# Patient Record
Sex: FEMALE | Race: BLACK OR AFRICAN AMERICAN | NOT HISPANIC OR LATINO
[De-identification: names, ages, dates, MRNs, and addresses within clinical notes are randomized per-mention and may not be internally consistent; named-entity substitution may affect disease eponyms.]

---

## 2017-01-18 ENCOUNTER — APPOINTMENT (OUTPATIENT)
Dept: OTOLARYNGOLOGY | Facility: CLINIC | Age: 82
End: 2017-01-18

## 2017-01-18 VITALS
HEART RATE: 80 BPM | DIASTOLIC BLOOD PRESSURE: 84 MMHG | TEMPERATURE: 97.7 F | SYSTOLIC BLOOD PRESSURE: 155 MMHG | OXYGEN SATURATION: 100 %

## 2017-01-18 RX ORDER — LOSARTAN POTASSIUM AND HYDROCHLOROTHIAZIDE 12.5; 5 MG/1; MG/1
50-12.5 TABLET ORAL
Qty: 90 | Refills: 0 | Status: ACTIVE | COMMUNITY
Start: 2017-01-13

## 2017-01-18 RX ORDER — AMLODIPINE BESYLATE 10 MG/1
10 TABLET ORAL
Qty: 90 | Refills: 0 | Status: ACTIVE | COMMUNITY
Start: 2016-11-28

## 2017-01-18 RX ORDER — LIDOCAINE 5 G/100G
5 OINTMENT TOPICAL
Qty: 106 | Refills: 0 | Status: ACTIVE | COMMUNITY
Start: 2016-06-23

## 2017-01-27 ENCOUNTER — OUTPATIENT (OUTPATIENT)
Dept: OUTPATIENT SERVICES | Facility: HOSPITAL | Age: 82
LOS: 1 days | End: 2017-01-27
Payer: COMMERCIAL

## 2017-01-27 PROCEDURE — 70553 MRI BRAIN STEM W/O & W/DYE: CPT

## 2017-01-27 PROCEDURE — A9585: CPT

## 2017-01-27 PROCEDURE — 70553 MRI BRAIN STEM W/O & W/DYE: CPT | Mod: 26

## 2017-02-01 ENCOUNTER — APPOINTMENT (OUTPATIENT)
Dept: OTOLARYNGOLOGY | Facility: CLINIC | Age: 82
End: 2017-02-01

## 2017-02-01 VITALS — OXYGEN SATURATION: 96 % | SYSTOLIC BLOOD PRESSURE: 149 MMHG | HEART RATE: 76 BPM | DIASTOLIC BLOOD PRESSURE: 82 MMHG

## 2017-02-01 DIAGNOSIS — H60.501 UNSPECIFIED ACUTE NONINFECTIVE OTITIS EXTERNA, RIGHT EAR: ICD-10-CM

## 2017-02-01 DIAGNOSIS — Z86.69 PERSONAL HISTORY OF OTHER DISEASES OF THE NERVOUS SYSTEM AND SENSE ORGANS: ICD-10-CM

## 2017-03-08 ENCOUNTER — OUTPATIENT (OUTPATIENT)
Dept: OUTPATIENT SERVICES | Facility: HOSPITAL | Age: 82
LOS: 1 days | End: 2017-03-08
Payer: COMMERCIAL

## 2017-03-08 PROCEDURE — G0297: CPT | Mod: 26

## 2017-04-21 PROCEDURE — G0297: CPT

## 2017-04-27 ENCOUNTER — APPOINTMENT (OUTPATIENT)
Dept: PULMONOLOGY | Facility: CLINIC | Age: 82
End: 2017-04-27

## 2017-04-27 VITALS
BODY MASS INDEX: 30.36 KG/M2 | HEIGHT: 62 IN | WEIGHT: 165 LBS | OXYGEN SATURATION: 98 % | TEMPERATURE: 98.4 F | HEART RATE: 74 BPM | SYSTOLIC BLOOD PRESSURE: 130 MMHG | DIASTOLIC BLOOD PRESSURE: 60 MMHG

## 2017-04-27 DIAGNOSIS — R91.8 OTHER NONSPECIFIC ABNORMAL FINDING OF LUNG FIELD: ICD-10-CM

## 2017-07-25 ENCOUNTER — FORM ENCOUNTER (OUTPATIENT)
Age: 82
End: 2017-07-25

## 2017-07-26 ENCOUNTER — OUTPATIENT (OUTPATIENT)
Dept: OUTPATIENT SERVICES | Facility: HOSPITAL | Age: 82
LOS: 1 days | End: 2017-07-26
Payer: MEDICARE

## 2017-07-26 PROCEDURE — 71250 CT THORAX DX C-: CPT | Mod: 26

## 2017-07-26 PROCEDURE — 71250 CT THORAX DX C-: CPT

## 2017-08-03 ENCOUNTER — APPOINTMENT (OUTPATIENT)
Dept: PULMONOLOGY | Facility: CLINIC | Age: 82
End: 2017-08-03

## 2017-08-03 VITALS
HEIGHT: 62 IN | BODY MASS INDEX: 30.36 KG/M2 | HEART RATE: 67 BPM | WEIGHT: 165 LBS | OXYGEN SATURATION: 99 % | DIASTOLIC BLOOD PRESSURE: 60 MMHG | SYSTOLIC BLOOD PRESSURE: 140 MMHG | TEMPERATURE: 96.9 F

## 2017-08-03 RX ORDER — AMOXICILLIN 500 MG/1
500 CAPSULE ORAL 3 TIMES DAILY
Qty: 21 | Refills: 6 | Status: ACTIVE | COMMUNITY
Start: 2017-08-03 | End: 1900-01-01

## 2017-10-08 ENCOUNTER — FORM ENCOUNTER (OUTPATIENT)
Age: 82
End: 2017-10-08

## 2017-10-09 ENCOUNTER — OUTPATIENT (OUTPATIENT)
Dept: OUTPATIENT SERVICES | Facility: HOSPITAL | Age: 82
LOS: 1 days | End: 2017-10-09
Payer: MEDICARE

## 2017-10-09 PROCEDURE — 71250 CT THORAX DX C-: CPT | Mod: 26

## 2017-10-09 PROCEDURE — 71250 CT THORAX DX C-: CPT

## 2017-10-13 ENCOUNTER — APPOINTMENT (OUTPATIENT)
Dept: PULMONOLOGY | Facility: CLINIC | Age: 82
End: 2017-10-13
Payer: MEDICARE

## 2017-10-13 VITALS
HEART RATE: 81 BPM | BODY MASS INDEX: 30.36 KG/M2 | HEIGHT: 62 IN | SYSTOLIC BLOOD PRESSURE: 146 MMHG | DIASTOLIC BLOOD PRESSURE: 64 MMHG | WEIGHT: 165 LBS | TEMPERATURE: 97 F | OXYGEN SATURATION: 98 %

## 2017-10-13 DIAGNOSIS — J43.2 CENTRILOBULAR EMPHYSEMA: ICD-10-CM

## 2017-10-13 PROCEDURE — 94010 BREATHING CAPACITY TEST: CPT

## 2017-10-13 PROCEDURE — 99215 OFFICE O/P EST HI 40 MIN: CPT | Mod: 25

## 2017-10-14 PROBLEM — J43.2 CENTRILOBULAR EMPHYSEMA: Status: ACTIVE | Noted: 2017-10-14

## 2017-11-29 ENCOUNTER — OUTPATIENT (OUTPATIENT)
Dept: OUTPATIENT SERVICES | Facility: HOSPITAL | Age: 82
LOS: 1 days | End: 2017-11-29
Payer: MEDICARE

## 2017-11-29 PROCEDURE — 70551 MRI BRAIN STEM W/O DYE: CPT | Mod: 26

## 2017-11-29 PROCEDURE — 70551 MRI BRAIN STEM W/O DYE: CPT

## 2017-12-05 ENCOUNTER — APPOINTMENT (OUTPATIENT)
Dept: OTOLARYNGOLOGY | Facility: CLINIC | Age: 82
End: 2017-12-05
Payer: MEDICARE

## 2017-12-05 VITALS
OXYGEN SATURATION: 97 % | SYSTOLIC BLOOD PRESSURE: 155 MMHG | DIASTOLIC BLOOD PRESSURE: 71 MMHG | HEART RATE: 71 BPM | TEMPERATURE: 98 F

## 2017-12-05 DIAGNOSIS — H61.23 IMPACTED CERUMEN, BILATERAL: ICD-10-CM

## 2017-12-05 DIAGNOSIS — R42 DIZZINESS AND GIDDINESS: ICD-10-CM

## 2017-12-05 DIAGNOSIS — H90.5 UNSPECIFIED SENSORINEURAL HEARING LOSS: ICD-10-CM

## 2017-12-05 PROCEDURE — 99214 OFFICE O/P EST MOD 30 MIN: CPT | Mod: 25

## 2017-12-05 PROCEDURE — 92550 TYMPANOMETRY & REFLEX THRESH: CPT

## 2017-12-05 PROCEDURE — 92557 COMPREHENSIVE HEARING TEST: CPT

## 2017-12-05 PROCEDURE — 69210 REMOVE IMPACTED EAR WAX UNI: CPT

## 2018-02-22 ENCOUNTER — INPATIENT (INPATIENT)
Facility: HOSPITAL | Age: 83
LOS: 7 days | Discharge: HOME HEALTH | DRG: 180 | End: 2018-03-02
Attending: INTERNAL MEDICINE | Admitting: INTERNAL MEDICINE
Payer: MEDICARE

## 2018-02-22 VITALS
DIASTOLIC BLOOD PRESSURE: 78 MMHG | SYSTOLIC BLOOD PRESSURE: 175 MMHG | RESPIRATION RATE: 16 BRPM | OXYGEN SATURATION: 96 % | HEART RATE: 78 BPM

## 2018-02-22 DIAGNOSIS — Z98.890 OTHER SPECIFIED POSTPROCEDURAL STATES: Chronic | ICD-10-CM

## 2018-02-22 LAB
ALBUMIN SERPL ELPH-MCNC: 4 G/DL — SIGNIFICANT CHANGE UP (ref 3.3–5)
ALP SERPL-CCNC: 235 U/L — HIGH (ref 40–120)
ALT FLD-CCNC: 28 U/L — SIGNIFICANT CHANGE UP (ref 10–45)
ANION GAP SERPL CALC-SCNC: 15 MMOL/L — SIGNIFICANT CHANGE UP (ref 5–17)
AST SERPL-CCNC: 30 U/L — SIGNIFICANT CHANGE UP (ref 10–40)
BASOPHILS NFR BLD AUTO: 0.3 % — SIGNIFICANT CHANGE UP (ref 0–2)
BILIRUB SERPL-MCNC: 0.6 MG/DL — SIGNIFICANT CHANGE UP (ref 0.2–1.2)
BUN SERPL-MCNC: 18 MG/DL — SIGNIFICANT CHANGE UP (ref 7–23)
CALCIUM SERPL-MCNC: 10.5 MG/DL — SIGNIFICANT CHANGE UP (ref 8.4–10.5)
CHLORIDE SERPL-SCNC: 95 MMOL/L — LOW (ref 96–108)
CK MB CFR SERPL CALC: 2.8 NG/ML — SIGNIFICANT CHANGE UP (ref 0–6.7)
CK SERPL-CCNC: 47 U/L — SIGNIFICANT CHANGE UP (ref 25–170)
CO2 SERPL-SCNC: 25 MMOL/L — SIGNIFICANT CHANGE UP (ref 22–31)
CREAT SERPL-MCNC: 0.9 MG/DL — SIGNIFICANT CHANGE UP (ref 0.5–1.3)
EOSINOPHIL NFR BLD AUTO: 1.7 % — SIGNIFICANT CHANGE UP (ref 0–6)
GGT SERPL-CCNC: 63 U/L — HIGH (ref 8–40)
GLUCOSE SERPL-MCNC: 103 MG/DL — HIGH (ref 70–99)
HCT VFR BLD CALC: 38.8 % — SIGNIFICANT CHANGE UP (ref 34.5–45)
HGB BLD-MCNC: 13.4 G/DL — SIGNIFICANT CHANGE UP (ref 11.5–15.5)
LYMPHOCYTES # BLD AUTO: 14.6 % — SIGNIFICANT CHANGE UP (ref 13–44)
MCHC RBC-ENTMCNC: 28.6 PG — SIGNIFICANT CHANGE UP (ref 27–34)
MCHC RBC-ENTMCNC: 34.5 G/DL — SIGNIFICANT CHANGE UP (ref 32–36)
MCV RBC AUTO: 82.7 FL — SIGNIFICANT CHANGE UP (ref 80–100)
MONOCYTES NFR BLD AUTO: 7.7 % — SIGNIFICANT CHANGE UP (ref 2–14)
NEUTROPHILS NFR BLD AUTO: 75.7 % — SIGNIFICANT CHANGE UP (ref 43–77)
NT-PROBNP SERPL-SCNC: 634 PG/ML — HIGH (ref 0–300)
PLATELET # BLD AUTO: 387 K/UL — SIGNIFICANT CHANGE UP (ref 150–400)
POTASSIUM SERPL-MCNC: 4.2 MMOL/L — SIGNIFICANT CHANGE UP (ref 3.5–5.3)
POTASSIUM SERPL-SCNC: 4.2 MMOL/L — SIGNIFICANT CHANGE UP (ref 3.5–5.3)
PROT SERPL-MCNC: 7.9 G/DL — SIGNIFICANT CHANGE UP (ref 6–8.3)
RBC # BLD: 4.69 M/UL — SIGNIFICANT CHANGE UP (ref 3.8–5.2)
RBC # FLD: 14.7 % — SIGNIFICANT CHANGE UP (ref 10.3–16.9)
SODIUM SERPL-SCNC: 135 MMOL/L — SIGNIFICANT CHANGE UP (ref 135–145)
TROPONIN T SERPL-MCNC: <0.01 NG/ML — SIGNIFICANT CHANGE UP (ref 0–0.01)
WBC # BLD: 10.9 K/UL — HIGH (ref 3.8–10.5)
WBC # FLD AUTO: 10.9 K/UL — HIGH (ref 3.8–10.5)

## 2018-02-22 PROCEDURE — 99285 EMERGENCY DEPT VISIT HI MDM: CPT | Mod: 25

## 2018-02-22 PROCEDURE — 93010 ELECTROCARDIOGRAM REPORT: CPT

## 2018-02-22 PROCEDURE — 71046 X-RAY EXAM CHEST 2 VIEWS: CPT | Mod: 26

## 2018-02-22 RX ORDER — HEPARIN SODIUM 5000 [USP'U]/ML
5000 INJECTION INTRAVENOUS; SUBCUTANEOUS EVERY 8 HOURS
Qty: 0 | Refills: 0 | Status: DISCONTINUED | OUTPATIENT
Start: 2018-02-22 | End: 2018-02-23

## 2018-02-22 RX ORDER — IPRATROPIUM/ALBUTEROL SULFATE 18-103MCG
3 AEROSOL WITH ADAPTER (GRAM) INHALATION EVERY 6 HOURS
Qty: 0 | Refills: 0 | Status: DISCONTINUED | OUTPATIENT
Start: 2018-02-22 | End: 2018-03-02

## 2018-02-22 NOTE — ED ADULT NURSE NOTE - OBJECTIVE STATEMENT
Patient reports issues with feeling short of breath off and on for 1 month now and felt worse out of all days today "when I woke up."  Patient reports she was not doing any activity at that time.  Reports moments of lateral side pains.  EKG performed upon arrival.  IV labs in progress.

## 2018-02-22 NOTE — H&P ADULT - NSHPPHYSICALEXAM_GEN_ALL_CORE
VITAL SIGNS:  T(F): 97.6 (02-22-18 @ 22:43), Max: 98.7 (02-22-18 @ 22:00)  HR: 88 (02-22-18 @ 22:43) (78 - 88)  BP: 143/76 (02-22-18 @ 22:43) (143/76 - 175/78)  BP(mean): --  RR: 18 (02-22-18 @ 22:43) (16 - 18)  SpO2: 97% (02-22-18 @ 22:43) (96% - 97%)    General: Thin, chronically ill appearing, non-diaphoretic, non-toxic appearing  HEENT: sclerae anicteric, PERRL, EOMI, dry MM, no pharyngeal erythema,  no JVD, negative hepatojugular reflex  Respiratory: Trace R sided rales, no rhonchi, no wheezes  Cardiac: RRR, no m/g/r  Gastrointestinal: soft, NT/ND; no rebound or guarding; +BSx4  Back: spine midline, no bony tenderness or step-offs; no CVAT B/L  Extremities: WWP, no clubbing or cyanosis; no peripheral edema  Musculoskeletal: NROM x4; no joint swelling, tenderness or erythema  Vascular: 2+ radial, femoral, DP/PT pulses B/L  Dermatologic: skin warm, dry and intact; LE venous stasis dermatitis  Lymphatic: no submandibular or cervical LAD  Neurologic: AAOx3; CNII-XII grossly intact; no focal deficits  Psychiatric: affect and characteristics of appearance, verbalizations, behaviors are appropriate, denies SI/HI/AH/VH VITAL SIGNS:  T(F): 97.6 (02-22-18 @ 22:43), Max: 98.7 (02-22-18 @ 22:00)  HR: 88 (02-22-18 @ 22:43) (78 - 88)  BP: 143/76 (02-22-18 @ 22:43) (143/76 - 175/78)  BP(mean): --  RR: 18 (02-22-18 @ 22:43) (16 - 18)  SpO2: 97% (02-22-18 @ 22:43) (96% - 97%)    General: Thin, chronically ill appearing, non-diaphoretic, non-toxic appearing  HEENT: sclerae anicteric, PERRL, EOMI, dry MM, no pharyngeal erythema,  no JVD, negative hepatojugular reflex  Respiratory: Trace R sided rales, no rhonchi, no wheezes  Cardiac: RRR, no m/g/r  Gastrointestinal: soft, NT/ND; no rebound or guarding; +BSx4  Back: spine midline, no bony tenderness or step-offs; no CVAT B/L  Extremities: WWP, no clubbing or cyanosis; 1+ LE edema R > L   Musculoskeletal: NROM x4; no joint swelling, tenderness or erythema  Vascular: 2+ radial, femoral, DP/PT pulses B/L  Dermatologic: skin warm, dry and intact; LE venous stasis dermatitis  Lymphatic: no submandibular or cervical LAD  Neurologic: AAOx3; CNII-XII grossly intact; no focal deficits  Psychiatric: affect and characteristics of appearance, verbalizations, behaviors are appropriate, denies SI/HI/AH/VH

## 2018-02-22 NOTE — H&P ADULT - PROBLEM SELECTOR PLAN 2
Pt satting 98% on RA at rest, desatting to 86% while ambulating.  Denies previous cardiac hx.  BNP elevated, however, previous BNP unavailable for reference. Pt euvolemic to slightly dry on exam.  Etiology likely 2/2 PNA in setting of severe emphysema.   - F/u CT chest   - F/u echo

## 2018-02-22 NOTE — ED ADULT TRIAGE NOTE - OTHER COMPLAINTS
pt c.o worsening mid sternal cp with sob x 1 week. pt also admits to dark stool. denies abd pain. no n/v.

## 2018-02-22 NOTE — H&P ADULT - ASSESSMENT
84 yo F heavy smoker w/ PMHx of pulmonary nodules, emphysema, previous hx of TB, HTN presents w/ worsening SULLIVAN x 2 wks, increased sputum production and cough w/ imaging concerning for post-obstructive PNA.

## 2018-02-22 NOTE — H&P ADULT - NSHPREVIEWOFSYSTEMS_GEN_ALL_CORE
REVIEW OF SYSTEMS:    CONSTITUTIONAL: Patient denies weakness, fevers or chills  EYES/ENT: Patient denies visual changes;  denies vertigo or throat pain   NECK: Patient denies pain or stiffness  RESPIRATORY: Patient denies cough, wheezing, hemoptysis; denies shortness of breath  CARDIOVASCULAR: Patient denies chest pain or palpitations  GASTROINTESTINAL: Patient denies abdominal or epigastric pain, nausea, vomiting, or hematemesis, diarrhea or constipation, melena or hematochezia.  GENITOURINARY: Patient denies dysuria, frequency or hematuria  NEUROLOGICAL: Patient denies numbness or weakness  SKIN: Patient denies itching, burning, rashes, or lesions   All other review of systems is negative unless indicated above.

## 2018-02-22 NOTE — H&P ADULT - PROBLEM SELECTOR PLAN 7
HSQ  DNR  Admit to [facility] for treatment of postobstructive PNA and workup of SOB Reports previous hx of TB at age 3, s/p treatment.    - Continue to monitor

## 2018-02-22 NOTE — H&P ADULT - PROBLEM SELECTOR PLAN 3
Multiple lung nodules on previous CT scan, w/ enlarging RLL nodule.  Pt reports hx of unintentional weight loss over past month, unable to quantify.   - consult pulm (Dr. Thornton) in AM  - will make NPO for possible bronch

## 2018-02-22 NOTE — H&P ADULT - NSHPLABSRESULTS_GEN_ALL_CORE
.  LABS:                         13.4   10.9  )-----------( 387      ( 22 Feb 2018 21:29 )             38.8     02-22    135  |  95<L>  |  18  ----------------------------<  103<H>  4.2   |  25  |  0.90    Ca    10.5      22 Feb 2018 21:29    TPro  7.9  /  Alb  4.0  /  TBili  0.6  /  DBili  x   /  AST  30  /  ALT  28  /  AlkPhos  235<H>  02-22        CARDIAC MARKERS ( 22 Feb 2018 21:29 )  x     / <0.01 ng/mL / 47 U/L / x     / 2.8 ng/mL      Serum Pro-Brain Natriuretic Peptide: 634 pg/mL (02-22 @ 21:29)        RADIOLOGY, EKG & ADDITIONAL TESTS: Reviewed.

## 2018-02-22 NOTE — H&P ADULT - PROBLEM SELECTOR PLAN 1
CXR concerning for RLL PNA.  Likely postobstructive in the setting of enlarging RLL nodule seen on previous images.  WBC mildly elevated.  Pt remains afebrile, other vitals WNL.     - Start Zosyn  - f/u sputum cultures  - f/u CT chest CXR concerning for RLL PNA.  Likely postobstructive in the setting of enlarging RLL nodule seen on previous images.  WBC mildly elevated.  Pt remains afebrile, other vitals WNL.     - Start Zosyn  - f/u sputum cultures  - f/u CT chest  - f/u HIV CXR concerning for RLL PNA given increased sputum production, cough.  Likely postobstructive in the setting of enlarging RLL nodule seen on previous images.  WBC mildly elevated.  Pt remains afebrile, other vitals WNL.     - Start Zosyn  - f/u sputum cultures  - f/u CT chest  - f/u HIV

## 2018-02-22 NOTE — H&P ADULT - FAMILY HISTORY
Mother  Still living? Unknown  Family history of pancreatic cancer, Age at diagnosis: Age Unknown     Child  Still living? Unknown  Family history of malignant melanoma, Age at diagnosis: Age Unknown

## 2018-02-22 NOTE — H&P ADULT - NSHPSOCIALHISTORY_GEN_ALL_CORE
1/2 PPD x 67 yrs, occasional marijuana smoker, denies IVDU, drinks 4-5 beers/day, ambulates w/ walker,

## 2018-02-22 NOTE — H&P ADULT - PROBLEM SELECTOR PLAN 5
Reports previous hx of TB at age 3, s/p treatment.    - Continue to monitor Chronic.  Likely 2/2 venous insufficiency w/ concurrent norvasc use.  R leg slightly larger than L.   - b/l LE dopplers to r/o DVT

## 2018-02-22 NOTE — ED PROVIDER NOTE - MEDICAL DECISION MAKING DETAILS
82 yo female with enlarging lung nodule, possibly malignant is being followed by Dr. Thornton. Worsening SOB/ SULLIVAN for one week after GE illness. Pt well appearing in ED however visibly SOB when walking to bathroom. will admit for further workup. Likely pulmonary cause of SOB.

## 2018-02-22 NOTE — H&P ADULT - HISTORY OF PRESENT ILLNESS
82 yo F heavy smoker w/ PMHx of pulmonary nodules, emphysema, previous hx of TB, HTN presents w/ worsening SULLIVAN x 2 wks.  Pt reports that she was previously able to ambulate up to 20 city blocks without difficulty, however, has been feeling progressively SOB w/ minimal exertion.  She reports a chronic cough productive of white sputum, however, endorses an increase in her cough and sputum production over the last two weeks, + yellow sputum.  Associated w/ intermittent hot flashes, no fevers or chills. Also reports significant weight loss in the past month, however, unable to quantify amount lost. She had TB at age 3 and was hospitalized for 4 yrs for treatment, denies recurrence. Pt has been smoking 1/2 PPD x 67 yrs, stopped smoking last week.  Follows w/ Dr. Thornton for monitoring of lung nodules discovered in March 2017.  Most concerning lung nodule in RLL, grew from 5 mm in 7/17 to 7 mm in 10/17.  Scheduled to repeat CT next month.  Had an echo in 10/17, denies hx of HF.  Has chronic LE edema. ROS negative for HA, changes in vision, nausea, vomiting, CP, orthopnea, PND, palpitations, abdominal pain, diarrhea, constipation melena, hematochezia, dysuria. 82 yo F heavy smoker w/ PMHx of pulmonary nodules, emphysema, previous hx of TB, HTN presents w/ worsening SULLIVAN x 2 wks.  Pt reports that she was previously able to ambulate up to 20 city blocks without difficulty, however, has been feeling progressively SOB w/ minimal exertion.  She reports a chronic cough productive of white sputum, however, endorses an increase in her cough and sputum production over the last two weeks, + yellow sputum.  Associated w/ intermittent hot flashes, no fevers or chills. Also reports significant weight loss in the past month, however, unable to quantify amount lost. She had TB at age 3 and was hospitalized for 4 yrs for treatment, denies recurrence. Pt has been smoking 1/2 PPD x 67 yrs, stopped smoking last week.  Follows w/ Dr. Thornton for monitoring of lung nodules discovered in March 2017.  Most concerning lung nodule in RLL, grew from 5 mm in 7/17 to 7 mm in 10/17.  Scheduled to repeat CT next month.  Had an echo in 10/17, denies hx of HF.  Has chronic LE edema. 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 ROS negative for HA, changes in vision, nausea, vomiting, CP, orthopnea, PND, palpitations, abdominal pain, diarrhea, constipation melena, hematochezia, dysuria.

## 2018-02-22 NOTE — H&P ADULT - PROBLEM SELECTOR PLAN 4
C/w home norvasc 5 mg, losartan 50 mg and HCTZ 12.5 daily w/ elevated GGT.  No RUQ tenderness.  Pt has not previously had a cholecystectomy.   - Continue to monitor

## 2018-02-23 ENCOUNTER — TRANSCRIPTION ENCOUNTER (OUTPATIENT)
Age: 83
End: 2018-02-23

## 2018-02-23 DIAGNOSIS — A15.9 RESPIRATORY TUBERCULOSIS UNSPECIFIED: ICD-10-CM

## 2018-02-23 DIAGNOSIS — R91.1 SOLITARY PULMONARY NODULE: ICD-10-CM

## 2018-02-23 DIAGNOSIS — R06.09 OTHER FORMS OF DYSPNEA: ICD-10-CM

## 2018-02-23 DIAGNOSIS — Z29.9 ENCOUNTER FOR PROPHYLACTIC MEASURES, UNSPECIFIED: ICD-10-CM

## 2018-02-23 DIAGNOSIS — J18.9 PNEUMONIA, UNSPECIFIED ORGANISM: ICD-10-CM

## 2018-02-23 DIAGNOSIS — I10 ESSENTIAL (PRIMARY) HYPERTENSION: ICD-10-CM

## 2018-02-23 DIAGNOSIS — R63.8 OTHER SYMPTOMS AND SIGNS CONCERNING FOOD AND FLUID INTAKE: ICD-10-CM

## 2018-02-23 DIAGNOSIS — R60.9 EDEMA, UNSPECIFIED: ICD-10-CM

## 2018-02-23 DIAGNOSIS — R74.8 ABNORMAL LEVELS OF OTHER SERUM ENZYMES: ICD-10-CM

## 2018-02-23 LAB — HIV 1+2 AB+HIV1 P24 AG SERPL QL IA: SIGNIFICANT CHANGE UP

## 2018-02-23 PROCEDURE — 99232 SBSQ HOSP IP/OBS MODERATE 35: CPT

## 2018-02-23 PROCEDURE — 99223 1ST HOSP IP/OBS HIGH 75: CPT

## 2018-02-23 PROCEDURE — 71250 CT THORAX DX C-: CPT | Mod: 26

## 2018-02-23 PROCEDURE — 93970 EXTREMITY STUDY: CPT | Mod: 26

## 2018-02-23 PROCEDURE — 93306 TTE W/DOPPLER COMPLETE: CPT | Mod: 26

## 2018-02-23 RX ORDER — LOSARTAN POTASSIUM 100 MG/1
50 TABLET, FILM COATED ORAL DAILY
Qty: 0 | Refills: 0 | Status: DISCONTINUED | OUTPATIENT
Start: 2018-02-23 | End: 2018-03-02

## 2018-02-23 RX ORDER — AMLODIPINE BESYLATE 2.5 MG/1
1 TABLET ORAL
Qty: 0 | Refills: 0 | COMMUNITY

## 2018-02-23 RX ORDER — AMLODIPINE BESYLATE 2.5 MG/1
5 TABLET ORAL DAILY
Qty: 0 | Refills: 0 | Status: DISCONTINUED | OUTPATIENT
Start: 2018-02-23 | End: 2018-03-02

## 2018-02-23 RX ORDER — CEFTRIAXONE 500 MG/1
1 INJECTION, POWDER, FOR SOLUTION INTRAMUSCULAR; INTRAVENOUS EVERY 24 HOURS
Qty: 0 | Refills: 0 | Status: DISCONTINUED | OUTPATIENT
Start: 2018-02-23 | End: 2018-03-01

## 2018-02-23 RX ORDER — HEPARIN SODIUM 5000 [USP'U]/ML
5000 INJECTION INTRAVENOUS; SUBCUTANEOUS EVERY 8 HOURS
Qty: 0 | Refills: 0 | Status: DISCONTINUED | OUTPATIENT
Start: 2018-02-23 | End: 2018-03-02

## 2018-02-23 RX ORDER — ONDANSETRON 8 MG/1
4 TABLET, FILM COATED ORAL ONCE
Qty: 0 | Refills: 0 | Status: COMPLETED | OUTPATIENT
Start: 2018-02-23 | End: 2018-02-23

## 2018-02-23 RX ORDER — HYDROCHLOROTHIAZIDE 25 MG
25 TABLET ORAL DAILY
Qty: 0 | Refills: 0 | Status: DISCONTINUED | OUTPATIENT
Start: 2018-02-23 | End: 2018-02-27

## 2018-02-23 RX ORDER — PIPERACILLIN AND TAZOBACTAM 4; .5 G/20ML; G/20ML
3.38 INJECTION, POWDER, LYOPHILIZED, FOR SOLUTION INTRAVENOUS EVERY 6 HOURS
Qty: 0 | Refills: 0 | Status: DISCONTINUED | OUTPATIENT
Start: 2018-02-23 | End: 2018-02-23

## 2018-02-23 RX ORDER — PIPERACILLIN AND TAZOBACTAM 4; .5 G/20ML; G/20ML
3.38 INJECTION, POWDER, LYOPHILIZED, FOR SOLUTION INTRAVENOUS EVERY 8 HOURS
Qty: 0 | Refills: 0 | Status: DISCONTINUED | OUTPATIENT
Start: 2018-02-23 | End: 2018-02-23

## 2018-02-23 RX ORDER — CEFTRIAXONE 500 MG/1
1 INJECTION, POWDER, FOR SOLUTION INTRAMUSCULAR; INTRAVENOUS EVERY 24 HOURS
Qty: 0 | Refills: 0 | Status: DISCONTINUED | OUTPATIENT
Start: 2018-02-23 | End: 2018-02-23

## 2018-02-23 RX ORDER — AZITHROMYCIN 500 MG/1
500 TABLET, FILM COATED ORAL EVERY 24 HOURS
Qty: 0 | Refills: 0 | Status: DISCONTINUED | OUTPATIENT
Start: 2018-02-23 | End: 2018-03-01

## 2018-02-23 RX ORDER — PIPERACILLIN AND TAZOBACTAM 4; .5 G/20ML; G/20ML
3.38 INJECTION, POWDER, LYOPHILIZED, FOR SOLUTION INTRAVENOUS ONCE
Qty: 0 | Refills: 0 | Status: COMPLETED | OUTPATIENT
Start: 2018-02-23 | End: 2018-02-23

## 2018-02-23 RX ADMIN — ONDANSETRON 4 MILLIGRAM(S): 8 TABLET, FILM COATED ORAL at 21:44

## 2018-02-23 RX ADMIN — HEPARIN SODIUM 5000 UNIT(S): 5000 INJECTION INTRAVENOUS; SUBCUTANEOUS at 07:05

## 2018-02-23 RX ADMIN — CEFTRIAXONE 100 GRAM(S): 500 INJECTION, POWDER, FOR SOLUTION INTRAMUSCULAR; INTRAVENOUS at 19:38

## 2018-02-23 RX ADMIN — Medication 25 MILLIGRAM(S): at 07:05

## 2018-02-23 RX ADMIN — AMLODIPINE BESYLATE 5 MILLIGRAM(S): 2.5 TABLET ORAL at 07:05

## 2018-02-23 RX ADMIN — AZITHROMYCIN 255 MILLIGRAM(S): 500 TABLET, FILM COATED ORAL at 19:38

## 2018-02-23 RX ADMIN — HEPARIN SODIUM 5000 UNIT(S): 5000 INJECTION INTRAVENOUS; SUBCUTANEOUS at 15:30

## 2018-02-23 RX ADMIN — PIPERACILLIN AND TAZOBACTAM 200 GRAM(S): 4; .5 INJECTION, POWDER, LYOPHILIZED, FOR SOLUTION INTRAVENOUS at 01:26

## 2018-02-23 RX ADMIN — LOSARTAN POTASSIUM 50 MILLIGRAM(S): 100 TABLET, FILM COATED ORAL at 07:05

## 2018-02-23 RX ADMIN — HEPARIN SODIUM 5000 UNIT(S): 5000 INJECTION INTRAVENOUS; SUBCUTANEOUS at 21:46

## 2018-02-23 RX ADMIN — PIPERACILLIN AND TAZOBACTAM 200 GRAM(S): 4; .5 INJECTION, POWDER, LYOPHILIZED, FOR SOLUTION INTRAVENOUS at 13:23

## 2018-02-23 RX ADMIN — Medication 3 MILLILITER(S): at 07:05

## 2018-02-23 NOTE — DISCHARGE NOTE ADULT - ADDITIONAL INSTRUCTIONS
Patient has follow up appointment with Dr. Thornton on 3/5 at 3:30 PM Patient has a follow up appointment with Dr. Thornton on 3/5 at 3:30 PM  Patient has a follow up appointment with Dr. Stroud on 3/6 at 11:30 AM  Patient can follow up with Dr. Finkelstein if she notices dark stools or bright red stools

## 2018-02-23 NOTE — PROGRESS NOTE ADULT - SUBJECTIVE AND OBJECTIVE BOX
O/N Events:  Patient admitted overnight for worsening shortness of breath possibly 2/2 post obstructive pneumonia, no other acute events at this time.  Subjective:  Patient seen and examined at bedside.    VITALS  Vital Signs Last 24 Hrs  T(C): 36.9 (23 Feb 2018 05:04), Max: 37.1 (22 Feb 2018 22:00)  T(F): 98.4 (23 Feb 2018 05:04), Max: 98.7 (22 Feb 2018 22:00)  HR: 84 (23 Feb 2018 05:04) (78 - 88)  BP: 131/76 (23 Feb 2018 05:04) (131/76 - 175/78)  BP(mean): --  RR: 18 (23 Feb 2018 05:04) (16 - 18)  SpO2: 97% (23 Feb 2018 05:04) (94% - 97%)    I&O's Summary      CAPILLARY BLOOD GLUCOSE          PHYSICAL EXAM  General: A&Ox 3; NAD  Head: NC/AT;   Eyes: PERRL; EOMI; anicteric sclera  Neck: Supple; no JVD  Respiratory: CTA B/L; no wheezes/crackles/rales auscultated w/ good air movement  Cardiovascular: Regular rhythm/rate; S1/S2; no gallops or murmurs auscultated  Gastrointestinal: Soft; NTND w/out rebound tenderness or guarding; bowel sounds normal  Extremities: WWP; no edema or cyanosis; radial/pedal pulses palpable  Neurological:  CNII-XII grossly intact; no obvious focal deficits    MEDICATIONS  (STANDING):  ALBUTerol/ipratropium for Nebulization 3 milliLiter(s) Nebulizer every 6 hours  amLODIPine   Tablet 5 milliGRAM(s) Oral daily  heparin  Injectable 5000 Unit(s) SubCutaneous every 8 hours  hydrochlorothiazide 25 milliGRAM(s) Oral daily  losartan 50 milliGRAM(s) Oral daily  piperacillin/tazobactam IVPB. 3.375 Gram(s) IV Intermittent every 6 hours    MEDICATIONS  (PRN):      LABS                        13.4   10.9  )-----------( 387      ( 22 Feb 2018 21:29 )             38.8     02-22    135  |  95<L>  |  18  ----------------------------<  103<H>  4.2   |  25  |  0.90    Ca    10.5      22 Feb 2018 21:29    TPro  7.9  /  Alb  4.0  /  TBili  0.6  /  DBili  x   /  AST  30  /  ALT  28  /  AlkPhos  235<H>  02-22    LIVER FUNCTIONS - ( 22 Feb 2018 21:29 )  Alb: 4.0 g/dL / Pro: 7.9 g/dL / ALK PHOS: 235 U/L / ALT: 28 U/L / AST: 30 U/L / GGT: 63 U/L           CARDIAC MARKERS ( 22 Feb 2018 21:29 )  x     / <0.01 ng/mL / 47 U/L / x     / 2.8 ng/mL O/N Events:  Patient admitted overnight for worsening shortness of breath possibly 2/2 post obstructive pneumonia, no other acute events at this time.  Subjective:  Patient seen and examined at bedside.  Denies any shortness of breath but does admit to shortness of breath when moving.  No other complaints at this time-denies any other complaints-no chest pain, abdominal pain, nausea, vomiting or diarrhea.    VITALS  Vital Signs Last 24 Hrs  T(C): 36.9 (23 Feb 2018 05:04), Max: 37.1 (22 Feb 2018 22:00)  T(F): 98.4 (23 Feb 2018 05:04), Max: 98.7 (22 Feb 2018 22:00)  HR: 84 (23 Feb 2018 05:04) (78 - 88)  BP: 131/76 (23 Feb 2018 05:04) (131/76 - 175/78)  BP(mean): --  RR: 18 (23 Feb 2018 05:04) (16 - 18)  SpO2: 97% (23 Feb 2018 05:04) (94% - 97%)    I&O's Summary      CAPILLARY BLOOD GLUCOSE          PHYSICAL EXAM  General: A&Ox 3; Elderly female sitting in bed in NAD  Head: NC/AT;   Eyes: PERRL; EOMI; anicteric sclera  Neck: Supple; no JVD  Respiratory: Crackles on the right side auscultated w/ good air movement  Cardiovascular: Regular rhythm/rate; S1/S2; no gallops or murmurs auscultated  Gastrointestinal: Soft; NTND w/out rebound tenderness or guarding; bowel sounds normal  Extremities: WWP; no edema or cyanosis; radial/pedal pulses palpable, dry skin  Neurological:  CNII-XII grossly intact; no obvious focal deficits    MEDICATIONS  (STANDING):  ALBUTerol/ipratropium for Nebulization 3 milliLiter(s) Nebulizer every 6 hours  amLODIPine   Tablet 5 milliGRAM(s) Oral daily  heparin  Injectable 5000 Unit(s) SubCutaneous every 8 hours  hydrochlorothiazide 25 milliGRAM(s) Oral daily  losartan 50 milliGRAM(s) Oral daily  piperacillin/tazobactam IVPB. 3.375 Gram(s) IV Intermittent every 6 hours    MEDICATIONS  (PRN):      LABS                        13.4   10.9  )-----------( 387      ( 22 Feb 2018 21:29 )             38.8     02-22    135  |  95<L>  |  18  ----------------------------<  103<H>  4.2   |  25  |  0.90    Ca    10.5      22 Feb 2018 21:29    TPro  7.9  /  Alb  4.0  /  TBili  0.6  /  DBili  x   /  AST  30  /  ALT  28  /  AlkPhos  235<H>  02-22    LIVER FUNCTIONS - ( 22 Feb 2018 21:29 )  Alb: 4.0 g/dL / Pro: 7.9 g/dL / ALK PHOS: 235 U/L / ALT: 28 U/L / AST: 30 U/L / GGT: 63 U/L           CARDIAC MARKERS ( 22 Feb 2018 21:29 )  x     / <0.01 ng/mL / 47 U/L / x     / 2.8 ng/mL

## 2018-02-23 NOTE — PHYSICAL THERAPY INITIAL EVALUATION ADULT - CRITERIA FOR SKILLED THERAPEUTIC INTERVENTIONS
rehab potential/anticipated discharge recommendation/impairments found/functional limitations in following categories/therapy frequency

## 2018-02-23 NOTE — PROGRESS NOTE ADULT - ATTENDING COMMENTS
I have examined the patient and discussed the management of this patient with the pulmonary team.  Briefly, she is an 82 yo female, managed by Dr. Thornton for COPD.  She has a remote history of TB as a child (80 years ago). Unlikely she received any anti-TB meds. She has multiple pulmonary nodules which are being followed.   For the last 1 month, she has not been feeling well. She has some dyspnea on exertion. No fevers, no night sweats.  She is afebrile at the time of exam. She does not look toxic. She has inspiratory crackles at the right lung posteriorly. No dullness to percussion.  CT chest shows multiple pulmonary nodules which are unchanged from prior CT scan. There is new opacity in RML and RLL which was not seen in the prior CTY scan almost 4 months ago. Of note, there is very prominent interlobular septae with nodules.   Impression:  It is possible that the patient has community acquired pneumonia. Certainly, the history is relatively short (4 weeks). The new opacities were not seen in the CT scan in Oct 2017.  On the other hand, the focal opacities in RML and RLL are suggestive of lymphangitic spread. This is further substantiated by the presence of interlobular septal thickening and small pulmonary nodules.   Agree with antibiotics that also cover atypical  organisms. Will observe till Monday. Unless there is very significant improvement in her condition till Monday, we will plan to do an EBUS, TBBx, BAL and cell cytology

## 2018-02-23 NOTE — PROGRESS NOTE ADULT - SUBJECTIVE AND OBJECTIVE BOX
Interval Events:  Patient seen and examined at bedside.  Pt of Dr Thornton, following for COPD and multiple lung nodules.  Now with 1 month of progressive SOB w new cough, productive sputum, no fevers.   Currently feels better, breathing comfortably on RA.    MEDICATIONS:  Pulmonary:  ALBUTerol/ipratropium for Nebulization 3 milliLiter(s) Nebulizer every 6 hours    Antimicrobials:  azithromycin  IVPB 500 milliGRAM(s) IV Intermittent every 24 hours  cefTRIAXone   IVPB 1 Gram(s) IV Intermittent every 24 hours    Anticoagulants:  heparin  Injectable 5000 Unit(s) SubCutaneous every 8 hours    Cardiac:  amLODIPine   Tablet 5 milliGRAM(s) Oral daily  hydrochlorothiazide 25 milliGRAM(s) Oral daily  losartan 50 milliGRAM(s) Oral daily      Allergies    No Known Allergies    Intolerances        Vital Signs Last 24 Hrs  T(C): 36.9 (23 Feb 2018 05:04), Max: 37.1 (22 Feb 2018 22:00)  T(F): 98.4 (23 Feb 2018 05:04), Max: 98.7 (22 Feb 2018 22:00)  HR: 84 (23 Feb 2018 05:04) (78 - 88)  BP: 131/76 (23 Feb 2018 05:04) (131/76 - 175/78)  BP(mean): --  RR: 18 (23 Feb 2018 05:04) (16 - 18)  SpO2: 97% (23 Feb 2018 05:04) (94% - 97%)        PHYSICAL EXAM:  General:  in no acute distress  HEENT: PERRL, EOMI, non icteric  Neck: Supple; no masses  Respiratory: decreased BS on the R along w exp wheezing (U>L) and crackles at the base up to 1/3 of the lung field  Cardiovascular: Regular rate and rhythm. S1 and S2 Normal; No murmurs  Gastrointestinal: Soft non-tender non-distended;   Extremities: WWP, no edema, no cyanosis  Neurological: Alert and oriented x3  Skin: Warm and dry. No obvious rash    LABS:      CBC Full  -  ( 22 Feb 2018 21:29 )  WBC Count : 10.9 K/uL  Hemoglobin : 13.4 g/dL  Hematocrit : 38.8 %  Platelet Count - Automated : 387 K/uL  Mean Cell Volume : 82.7 fL  Mean Cell Hemoglobin : 28.6 pg  Mean Cell Hemoglobin Concentration : 34.5 g/dL  Auto Neutrophil # : x  Auto Lymphocyte # : x  Auto Monocyte # : x  Auto Eosinophil # : x  Auto Basophil # : x  Auto Neutrophil % : 75.7 %  Auto Lymphocyte % : 14.6 %  Auto Monocyte % : 7.7 %  Auto Eosinophil % : 1.7 %  Auto Basophil % : 0.3 %    02-22    135  |  95<L>  |  18  ----------------------------<  103<H>  4.2   |  25  |  0.90    Ca    10.5      22 Feb 2018 21:29    TPro  7.9  /  Alb  4.0  /  TBili  0.6  /  DBili  x   /  AST  30  /  ALT  28  /  AlkPhos  235<H>  02-22                      RADIOLOGY imaging reviewed

## 2018-02-23 NOTE — PROGRESS NOTE ADULT - ATTENDING COMMENTS
Pt seen and examined, VSS, exam with RLL crackles, minimal LE edema, normal nails, labs and imaging reviewed.    Agree with assessment as above with some changes  1. Emphysema/COPD  2. Pulmonary nodules with possible cancer  3. Lung scarring  4. TB, s/p tx  - low suspicion for active PNA, however in setting of recent onset of yellow sputum and SOB will f/u response to ceftriaxone/azithromycin  - will consult pulmonary to eval for possible biopsy as inpatient vs outpt  - rest of plan as above

## 2018-02-23 NOTE — DISCHARGE NOTE ADULT - PLAN OF CARE
To treat the pneumonia. You were diagnosed with a RML/RLL pneumonia which was confirmed by CXR and CT. There was a slight elevation in your WBC which was indicative of an infection. You were treated with Ceftriaxone and Azithromycin and responded accordingly with a decrease in the WBC. If symptoms return or worsen return to the ED.   - Continue treatment with Cefdinir and Azithromycin To establish follow up for treatment You have a history of lung nodules that you follow up w/ Dr. Thornton for since March 2017. As per your medical history the most concerning lung nodule is in the RLL which grew from 5 mm in 7/17 to 7 mm in 10/17. Upon your admission to [facility] you were evaluated by pulmonology. Pulmonology evaluated the CT scan which confirmed the nodule and revealed lymphadenopathy and suspicion of lymphangitic spread pattern. PET/CT scan was ordered and based on those results it will help determine the best way to proceed with bronchoscopy and further treatment. Screen for re-activation of disease You have a history of TB infection when you were 3 y/o. You were treated successfully at the time. There was no evidence of re-activation of the infection during your stay in the hospital. Continue to follow up with your PMD. Maintain blood pressure control You have a history of HTN prior to your admission to the hospital. You were treated at the hospital with your home dose of medications. Upon discharge please continue with home norvasc 5 mg, losartan 50 mg and HCTZ 12.5 daily. Continue to follow up with your PMD Follow up with Dr. Thornton When you came to the hospital, you were diagnosed with a pneumonia which was confirmed by CXR and CT scan.  You were treated with antibiotics with resolution of the infection.  If symptoms such as shortness of breath, cough or sputum production return or worsen, you can return to the ED.  You were also prescribed an inhaler which you can continue to use for shortness of breath. You have a history of lung nodules and on this admission, you had a biopsy taken during a bronchoscopy with the pulmonary team due to the concern of cancer.  Please follow up with Dr. Thornton on 3/5/18 and Dr. Stroud on 3/6/18 at 11:30 AM for the results of the biopsy. You have a history of TB infection when you were 3 years ols. You were treated successfully at the time. There was no evidence of re-activation of the infection during your stay in the hospital.  Continue to follow up with Dr. Thornton, you have an appointment on 3/5/18 at 3:30 PM. You have a history of HTN prior to your admission to the hospital.  You were treated at the hospital with your home dose of medications. Upon discharge please continue with home norvasc 5 mg and losartan 50 mg and follow up with Dr. Thornton, you have an appointment on 3/5/18 at 3:30PM When you came to the hospital, you had pain and inflammation in your ankle likely due to gout.  Please continue to take colchicine 0.6mg daily for one week and follow up with Dr. Thornton, you have an appointment on 3/5/18.

## 2018-02-23 NOTE — PHYSICAL THERAPY INITIAL EVALUATION ADULT - ADDITIONAL COMMENTS
Patient lives in elevator apartment building with no stairs, independent with mobility, ambulates with cane, no other DME.

## 2018-02-23 NOTE — PROGRESS NOTE ADULT - PROBLEM SELECTOR PLAN 8
-No IVF indicated  -Will replete to K>4 and Mg>2  -NPO for possible bronch then DASH/TLC diet  -Dispo [facility]  -DNR -No IVF indicated  -Will replete to K>4 and Mg>2  -DASH/TLC diet  -Dispo [facility]  -DNR

## 2018-02-23 NOTE — PROGRESS NOTE ADULT - PROBLEM SELECTOR PLAN 1
-CXR concerning for RLL PNA.  Possibly postobstructive in the setting of enlarging RLL nodule seen on previous images.  WBC mildly elevated to 10.9.  Patient remains afebrile, other vitals WNL.  -Zosyn 3.375 q 6  -F/u sputum cultures  -F/u CT chest  -HIV negative  -Pulm consulted, will appreciate recs

## 2018-02-23 NOTE — PROGRESS NOTE ADULT - PROBLEM SELECTOR PLAN 1
Sx improving w Zosyn, WBC normal w normal differential.  I have reviewed the CT of the chest - there is significant centrilobular as well as paraseptal emphysema, there are multiple area of opacification in the RLL, RML as well as some ground glass opacities in the LLL. When comparing her nodules to the last CT from 10/17 they seem unchanged in size.  Given the fact she has been feeling ill for about a month, this is possibly an atypical infection. She is at risk for gram negative bacterias or resistant bacteria given her structural lung disease, however her presentation would likely be more impressive given the duration of her sx. Aspiration should also be considered in any case of R sided involvement that is far more extensive than the left, however she does not appear to be high risk based on history.  Recommend -  - Complete a 7 day course of Ceftriaxone and Azithro to cover for CAP, can switch to Cefdinir and Azithro on d/c  - Pt to follow up w Lizzette Thornton or Nik in the next 2 weeks as out pt Sx improving w Zosyn, WBC normal w normal differential.  I have reviewed the CT of the chest - there is significant centrilobular as well as paraseptal emphysema, there are multiple area of opacification in the RLL, RML as well as some ground glass opacities in the LLL. When comparing her nodules to the last CT from 10/17 they seem unchanged in size, however there is significant inerlobular septal thickening which is concerning for lymphatic spread of malignancy  Given the fact she has been feeling ill for about a month, this is possibly an atypical infection. She is at risk for gram negative bacterias or resistant bacteria given her structural lung disease, however her presentation would likely be more impressive given the duration of her sx. Aspiration should also be considered in any case of R sided involvement that is far more extensive than the left, however she does not appear to be high risk based on history.  Recommend -  - Complete a 7 day course of Ceftriaxone and Azithro to cover for CAP, can switch to Cefdinir and Azithro on d/c  - Will consider a bronchoscopy w EBUS to evaluate for malignancy, possibly monday Sx improving w Zosyn, WBC normal w normal differential.  I have reviewed the CT of the chest - there is significant centrilobular as well as paraseptal emphysema, there are multiple area of opacification in the RLL, RML as well as some ground glass opacities in the LLL. When comparing her nodules to the last CT from 10/17 they seem unchanged in size, however there is significant interlobular septal thickening which is concerning for lymphatic spread of malignancy  Given the fact she has been feeling ill for about a month, this is possibly an atypical infection. She is at risk for gram negative bacterias or resistant bacteria given her structural lung disease, however her presentation would likely be more impressive given the duration of her sx. Aspiration should also be considered in any case of R sided involvement that is far more extensive than the left, however she does not appear to be high risk based on history.  Recommend -  - Complete a 7 day course of Ceftriaxone and Azithro to cover for CAP, can switch to Cefdinir and Azithro on d/c  - Will consider a bronchoscopy w EBUS to evaluate for malignancy, possibly monday

## 2018-02-23 NOTE — DISCHARGE NOTE ADULT - CARE PROVIDER_API CALL
Buddy Thornton), Internal Medicine; Pulmonary Disease  [address]  [address] [place] [address]  Phone: [phone]  Fax: [phone]    Misa Stroud), HematologyOncology; Internal Medicine  [address] [address]  Phone: [phone]  Fax: [phone]

## 2018-02-23 NOTE — PROGRESS NOTE ADULT - PROBLEM SELECTOR PLAN 2
-Patient saturating 98% on RA at rest, desaturating to 86% while ambulating.  Denies previous cardiac hx.  BNP slightly elevated to 634, however, previous BNP unavailable for reference.  Etiology likely 2/2 PNA in setting of severe emphysema.   -F/U CT chest   -Echocardiogram shows EF 60-65% and no diastolic dysfunction  -Duoneb q 6

## 2018-02-23 NOTE — PROGRESS NOTE ADULT - ASSESSMENT
83 year old female with approximately 35 pack year smoking history PMH of pulmonary nodules, emphysema, previous hx of TB, HTN who presents with worsening dyspnea on exertion for two weeks, increased sputum production and cough wirh imaging concerning for post-obstructive PNA.

## 2018-02-23 NOTE — DISCHARGE NOTE ADULT - MEDICATION SUMMARY - MEDICATIONS TO TAKE
I will START or STAY ON the medications listed below when I get home from the hospital:    losartan 50 mg oral tablet  -- 1 tab(s) by mouth once a day  -- Indication: For HTN (hypertension)    colchicine 0.6 mg oral tablet  -- 1 tab(s) by mouth once a day  -- Indication: For Gout    ipratropium-albuterol 0.5 mg-2.5 mg/3 mLinhalation solution  -- 3 milliliter(s) inhaled every 6 hours  -- Indication: For Shortness of breath    amLODIPine 5 mg oral tablet  -- 1 tab(s) by mouth once a day  -- Indication: For HTN (hypertension)

## 2018-02-23 NOTE — DISCHARGE NOTE ADULT - PATIENT PORTAL LINK FT
You can access the "[employer] Patient Portal, offered by [facility], by registering with the following website: [email or web address]

## 2018-02-23 NOTE — DISCHARGE NOTE ADULT - CARE PLAN
Principal Discharge DX:	Pneumonia  Secondary Diagnosis:	Dyspnea on exertion  Secondary Diagnosis:	Lung nodule seen on imaging study  Secondary Diagnosis:	Tuberculosis  Secondary Diagnosis:	HTN (hypertension) Principal Discharge DX:	Pneumonia  Goal:	To treat the pneumonia.  Assessment and plan of treatment:	You were diagnosed with a RML/RLL pneumonia which was confirmed by CXR and CT. There was a slight elevation in your WBC which was indicative of an infection. You were treated with Ceftriaxone and Azithromycin and responded accordingly with a decrease in the WBC. If symptoms return or worsen return to the ED.   - Continue treatment with Cefdinir and Azithromycin  Secondary Diagnosis:	Lung nodule seen on imaging study  Goal:	To establish follow up for treatment  Assessment and plan of treatment:	You have a history of lung nodules that you follow up w/ Dr. Thornton for since March 2017. As per your medical history the most concerning lung nodule is in the RLL which grew from 5 mm in 7/17 to 7 mm in 10/17. Upon your admission to [facility] you were evaluated by pulmonology. Pulmonology evaluated the CT scan which confirmed the nodule and revealed lymphadenopathy and suspicion of lymphangitic spread pattern. PET/CT scan was ordered and based on those results it will help determine the best way to proceed with bronchoscopy and further treatment.  Secondary Diagnosis:	Tuberculosis  Goal:	Screen for re-activation of disease  Assessment and plan of treatment:	You have a history of TB infection when you were 3 y/o. You were treated successfully at the time. There was no evidence of re-activation of the infection during your stay in the hospital. Continue to follow up with your PMD.  Secondary Diagnosis:	HTN (hypertension)  Goal:	Maintain blood pressure control  Assessment and plan of treatment:	You have a history of HTN prior to your admission to the hospital. You were treated at the hospital with your home dose of medications. Upon discharge please continue with home norvasc 5 mg, losartan 50 mg and HCTZ 12.5 daily. Continue to follow up with your PMD Principal Discharge DX:	Pneumonia  Goal:	Follow up with Dr. Thornton  Assessment and plan of treatment:	When you came to the hospital, you were diagnosed with a pneumonia which was confirmed by CXR and CT scan.  You were treated with antibiotics with resolution of the infection.  If symptoms such as shortness of breath, cough or sputum production return or worsen, you can return to the ED.  You were also prescribed an inhaler which you can continue to use for shortness of breath.  Secondary Diagnosis:	Lung nodule seen on imaging study  Assessment and plan of treatment:	You have a history of lung nodules and on this admission, you had a biopsy taken during a bronchoscopy with the pulmonary team due to the concern of cancer.  Please follow up with Dr. Thornton on 3/5/18 and Dr. Stroud on 3/6/18 at 11:30 AM for the results of the biopsy.  Secondary Diagnosis:	Tuberculosis  Assessment and plan of treatment:	You have a history of TB infection when you were 3 years ols. You were treated successfully at the time. There was no evidence of re-activation of the infection during your stay in the hospital.  Continue to follow up with Dr. Thornton, you have an appointment on 3/5/18 at 3:30 PM.  Secondary Diagnosis:	HTN (hypertension)  Goal:	Maintain blood pressure control  Assessment and plan of treatment:	You have a history of HTN prior to your admission to the hospital.  You were treated at the hospital with your home dose of medications. Upon discharge please continue with home norvasc 5 mg and losartan 50 mg and follow up with Dr. Thornton, you have an appointment on 3/5/18 at 3:30PM  Secondary Diagnosis:	Gout  Assessment and plan of treatment:	When you came to the hospital, you had pain and inflammation in your ankle likely due to gout.  Please continue to take colchicine 0.6mg daily for one week and follow up with Dr. Thornton, you have an appointment on 3/5/18.

## 2018-02-23 NOTE — PROGRESS NOTE ADULT - PROBLEM SELECTOR PLAN 3
-Multiple lung nodules on previous CT scan, w/ enlarging RLL nodule.  Patient reports history of unintentional weight loss over past month, unable to quantify.   -Pulm consulted for possible bronchoscopy

## 2018-02-23 NOTE — PHYSICAL THERAPY INITIAL EVALUATION ADULT - PERTINENT HX OF CURRENT PROBLEM, REHAB EVAL
83 year old female presents w/ worsening SULLIVAN x 2 wks.  Pt reports that she was previously able to ambulate up to 20 city blocks without difficulty, however, has been feeling progressively SOB w/ minimal exertion.  She reports a chronic cough productive of white sputum, however, endorses an increase in her cough and sputum production over the last two weeks, + yellow sputum.

## 2018-02-23 NOTE — DISCHARGE NOTE ADULT - HOSPITAL COURSE
82 yo F heavy smoker w/ PMHx of pulmonary nodules, emphysema,     previous hx of TB, HTN presents w/ worsening SULLIVAN x 2 wks.  Pt reports     that she was previously able to ambulate up to 20 city blocks without     difficulty, however, has been feeling progressively SOB w/ minimal     exertion.  She reports a chronic cough productive of white sputum,     however, endorses an increase in her cough and sputum production over     the last two weeks, + yellow sputum.  Associated w/ intermittent hot     flashes, no fevers or chills. Also reports significant weight loss in     the past month, however, unable to quantify amount lost. She had TB     at age 3 and was hospitalized for 4 yrs for treatment, denies     recurrence. Pt has been smoking 1/2 PPD x 67 yrs, stopped smoking     last week.  Follows w/ Dr. Thornton for monitoring of lung nodules     discovered in March 2017.  Most concerning lung nodule in RLL, grew     from 5 mm in 7/17 to 7 mm in 10/17.  Scheduled to repeat CT next     month.  Had an echo in 10/17, denies hx of HF.  Has chronic LE edema.     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 She was initially started on zosyn and then switched to     ceftriaxone/azithromycin as per pulm recs, EBUS likely on Monday.     Overnight patient felt nauseous and was give zofran (QTc at 469). The     following day she desaturated to high 80s-low 90s while on room air     so given 2L NC for breathing comfort. On 2/26 patient went for a walk     with PT and desaturated as low as 86%. She had to stop during the     walk and then saturation came up to low 90s and remained in low 90s     during walk back to room. On 2/27 pt went for PET scan, results     pending. 82 yo F heavy smoker w/ PMHx of pulmonary nodules, emphysema,   previous hx of TB, HTN presents w/ worsening SULLIVAN x 2 wks.  Pt reports   that she was previously able to ambulate up to 20 city blocks without difficulty, however, has been feeling progressively SOB w/ minimal exertion.  She reports a chronic cough productive of white sputum, however, endorses an increase in her cough and sputum production over   the last two weeks, + yellow sputum.  Associated w/ intermittent hot   flashes, no fevers or chills. Also reports significant weight loss in   the past month, however, unable to quantify amount lost. She had TB at age 3 and was hospitalized for 4 yrs for treatment, denies recurrence. Pt has been smoking 1/2 PPD x 67 yrs, stopped smoking   last week.  Follows w/ Dr. Thornton for monitoring of lung nodules which were discovered in March 2017.  Most concerning lung nodule in RLL, grew from 5 mm in 7/17 to 7 mm in 10/17.  Scheduled to repeat CT next month. She had an echo on 10/17, denies hx of HF.  Has chronic LE edema. 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 She was initially started on zosyn and then switched to ceftriaxone/azithromycin as per pulm recs. Overnight patient felt nauseous and was give zofran (QTc at 469). The following day she desaturated to high 80s-low 90s while on room air and she was given 2L NC for breathing comfort. On 2/26 patient went for a walk with PT and desaturated as low as 86%. She had to stop during the walk and then saturation came up to low 90s and remained in low 90s during walk back to room. On 2/27 pt went for PET scan, results pending. 83 year old female heavy smoker with PMH of pulmonary nodules, emphysema,   previous hx of TB, HTN presents who presented with worsening SULLIVAN for 2 weeks admitted for pneumonia.  She was initially started on zosyn and then switched to ceftriaxone/azithromycin as per pulm recs.  Patient was comfortable breathing on room air but when ambulating would desaturate to the 80s showing the need for for home oxygen.  On 2/27 pt went for PET scan showing likely metastatic lung cancer to the liver and bones so heme/onc was consulted.  Patient went for bronchoscopy and lymph node biopsy was taken, results are pending.  Brain MRI without evidence of metastases.  Woke up the following morning with painful and swollen left ankle and diagnosed with gout so she was started on colchicine.  Patient deemed medically stable for discharge.  Plan of care and return precautions were discussed and patient verbalized understanding.  Discharge counseling was provided using the [facility] protocol. Teach back was demonstrated by the patient.

## 2018-02-23 NOTE — PROGRESS NOTE ADULT - ASSESSMENT
84 yo F, PMH of COPD, multiple lung nodules, remote history of TB as a child, now with multilobar pneumonia.

## 2018-02-24 LAB
ALBUMIN SERPL ELPH-MCNC: 3.6 G/DL — SIGNIFICANT CHANGE UP (ref 3.3–5)
ALP SERPL-CCNC: 223 U/L — HIGH (ref 40–120)
ALT FLD-CCNC: 21 U/L — SIGNIFICANT CHANGE UP (ref 10–45)
ANION GAP SERPL CALC-SCNC: 13 MMOL/L — SIGNIFICANT CHANGE UP (ref 5–17)
AST SERPL-CCNC: 24 U/L — SIGNIFICANT CHANGE UP (ref 10–40)
BILIRUB SERPL-MCNC: 0.6 MG/DL — SIGNIFICANT CHANGE UP (ref 0.2–1.2)
BUN SERPL-MCNC: 18 MG/DL — SIGNIFICANT CHANGE UP (ref 7–23)
CALCIUM SERPL-MCNC: 9.6 MG/DL — SIGNIFICANT CHANGE UP (ref 8.4–10.5)
CHLORIDE SERPL-SCNC: 96 MMOL/L — SIGNIFICANT CHANGE UP (ref 96–108)
CO2 SERPL-SCNC: 27 MMOL/L — SIGNIFICANT CHANGE UP (ref 22–31)
CREAT SERPL-MCNC: 1.03 MG/DL — SIGNIFICANT CHANGE UP (ref 0.5–1.3)
GLUCOSE SERPL-MCNC: 92 MG/DL — SIGNIFICANT CHANGE UP (ref 70–99)
HCT VFR BLD CALC: 38.7 % — SIGNIFICANT CHANGE UP (ref 34.5–45)
HGB BLD-MCNC: 13.1 G/DL — SIGNIFICANT CHANGE UP (ref 11.5–15.5)
MAGNESIUM SERPL-MCNC: 1.7 MG/DL — SIGNIFICANT CHANGE UP (ref 1.6–2.6)
MCHC RBC-ENTMCNC: 28.5 PG — SIGNIFICANT CHANGE UP (ref 27–34)
MCHC RBC-ENTMCNC: 33.9 G/DL — SIGNIFICANT CHANGE UP (ref 32–36)
MCV RBC AUTO: 84.3 FL — SIGNIFICANT CHANGE UP (ref 80–100)
PLATELET # BLD AUTO: 380 K/UL — SIGNIFICANT CHANGE UP (ref 150–400)
POTASSIUM SERPL-MCNC: 4.3 MMOL/L — SIGNIFICANT CHANGE UP (ref 3.5–5.3)
POTASSIUM SERPL-SCNC: 4.3 MMOL/L — SIGNIFICANT CHANGE UP (ref 3.5–5.3)
PROT SERPL-MCNC: 7.3 G/DL — SIGNIFICANT CHANGE UP (ref 6–8.3)
RBC # BLD: 4.59 M/UL — SIGNIFICANT CHANGE UP (ref 3.8–5.2)
RBC # FLD: 14.9 % — SIGNIFICANT CHANGE UP (ref 10.3–16.9)
SODIUM SERPL-SCNC: 136 MMOL/L — SIGNIFICANT CHANGE UP (ref 135–145)
WBC # BLD: 8.6 K/UL — SIGNIFICANT CHANGE UP (ref 3.8–10.5)
WBC # FLD AUTO: 8.6 K/UL — SIGNIFICANT CHANGE UP (ref 3.8–10.5)

## 2018-02-24 PROCEDURE — 99232 SBSQ HOSP IP/OBS MODERATE 35: CPT

## 2018-02-24 PROCEDURE — 99233 SBSQ HOSP IP/OBS HIGH 50: CPT | Mod: GC

## 2018-02-24 RX ORDER — MAGNESIUM SULFATE 500 MG/ML
1 VIAL (ML) INJECTION ONCE
Qty: 0 | Refills: 0 | Status: COMPLETED | OUTPATIENT
Start: 2018-02-24 | End: 2018-02-24

## 2018-02-24 RX ORDER — SIMETHICONE 80 MG/1
80 TABLET, CHEWABLE ORAL ONCE
Qty: 0 | Refills: 0 | Status: COMPLETED | OUTPATIENT
Start: 2018-02-24 | End: 2018-02-24

## 2018-02-24 RX ORDER — ONDANSETRON 8 MG/1
4 TABLET, FILM COATED ORAL ONCE
Qty: 0 | Refills: 0 | Status: COMPLETED | OUTPATIENT
Start: 2018-02-24 | End: 2018-02-24

## 2018-02-24 RX ADMIN — ONDANSETRON 4 MILLIGRAM(S): 8 TABLET, FILM COATED ORAL at 21:04

## 2018-02-24 RX ADMIN — HEPARIN SODIUM 5000 UNIT(S): 5000 INJECTION INTRAVENOUS; SUBCUTANEOUS at 21:06

## 2018-02-24 RX ADMIN — Medication 100 GRAM(S): at 11:19

## 2018-02-24 RX ADMIN — Medication 3 MILLILITER(S): at 10:00

## 2018-02-24 RX ADMIN — AMLODIPINE BESYLATE 5 MILLIGRAM(S): 2.5 TABLET ORAL at 06:49

## 2018-02-24 RX ADMIN — HEPARIN SODIUM 5000 UNIT(S): 5000 INJECTION INTRAVENOUS; SUBCUTANEOUS at 06:49

## 2018-02-24 RX ADMIN — AZITHROMYCIN 255 MILLIGRAM(S): 500 TABLET, FILM COATED ORAL at 19:52

## 2018-02-24 RX ADMIN — Medication 3 MILLILITER(S): at 15:29

## 2018-02-24 RX ADMIN — CEFTRIAXONE 100 GRAM(S): 500 INJECTION, POWDER, FOR SOLUTION INTRAMUSCULAR; INTRAVENOUS at 18:55

## 2018-02-24 RX ADMIN — Medication 25 MILLIGRAM(S): at 06:49

## 2018-02-24 RX ADMIN — LOSARTAN POTASSIUM 50 MILLIGRAM(S): 100 TABLET, FILM COATED ORAL at 06:49

## 2018-02-24 RX ADMIN — SIMETHICONE 80 MILLIGRAM(S): 80 TABLET, CHEWABLE ORAL at 15:29

## 2018-02-24 RX ADMIN — HEPARIN SODIUM 5000 UNIT(S): 5000 INJECTION INTRAVENOUS; SUBCUTANEOUS at 13:09

## 2018-02-24 NOTE — PROGRESS NOTE ADULT - PROBLEM SELECTOR PLAN 2
-Patient saturating 98% on RA at rest, desaturating to 86% while ambulating.  Denies previous cardiac hx.  BNP slightly elevated to 634, however, previous BNP unavailable for reference.  Etiology likely 2/2 PNA in setting of severe emphysema.   -F/U CT chest   -Echocardiogram shows EF 60-65% and no diastolic dysfunction  -Duoneb q 6 -Patient saturating 98% on RA at rest, desaturating to 86% while ambulating.  Denies previous cardiac hx.  BNP slightly elevated to 634, however, previous BNP unavailable for reference.  Etiology likely 2/2 PNA in setting of severe emphysema.   -CT chest as above  -Echocardiogram shows EF 60-65% and no diastolic dysfunction  -Duoneb q 6

## 2018-02-24 NOTE — PROGRESS NOTE ADULT - PROBLEM SELECTOR PLAN 1
-CXR concerning for RLL PNA.  Possibly postobstructive in the setting of enlarging RLL nodule seen on previous images.  WBC mildly elevated to 10.9.  Patient remains afebrile, other vitals WNL.  -Zosyn 3.375 q 6  -F/u sputum cultures  -F/u CT chest  -HIV negative  -Pulm consulted, will appreciate recs -CXR and CT concerning for RML/RLL PNA.  No leukocytosis.  Patient remains afebrile, other vitals WNL.  -ceftriaxone 1g q 24 and azithromycin 500mg q 24 per pulm recs  -F/u sputum cultures  -CT chest as above  -HIV negative  -Pulm consulted, patient for likely bronchoscopy Monday

## 2018-02-24 NOTE — PROGRESS NOTE ADULT - ASSESSMENT
83 year old female with approximately 35 pack year smoking history PMH of pulmonary nodules, emphysema, previous hx of TB, HTN who presents with worsening dyspnea on exertion for two weeks, increased sputum production and cough wirh imaging concerning for post-obstructive PNA. 83 year old female with approximately 35 pack year smoking history PMH of pulmonary nodules, emphysema, previous hx of TB, HTN who presents with worsening dyspnea on exertion for two weeks, increased sputum production and cough with imaging concerning for PNA.

## 2018-02-24 NOTE — PROGRESS NOTE ADULT - SUBJECTIVE AND OBJECTIVE BOX
O/N Events:  Subjective:    VITALS  Vital Signs Last 24 Hrs  T(C): 36.7 (24 Feb 2018 05:35), Max: 37.2 (23 Feb 2018 21:30)  T(F): 98.1 (24 Feb 2018 05:35), Max: 98.9 (23 Feb 2018 21:30)  HR: 103 (24 Feb 2018 05:35) (88 - 103)  BP: 116/69 (24 Feb 2018 05:35) (116/69 - 155/79)  BP(mean): --  RR: 18 (24 Feb 2018 05:35) (18 - 18)  SpO2: 94% (24 Feb 2018 05:35) (94% - 97%)    I&O's Summary      CAPILLARY BLOOD GLUCOSE          PHYSICAL EXAM  General: A&Ox 3; NAD  Head: NC/AT;   Eyes: PERRL; EOMI; anicteric sclera  Neck: Supple; no JVD  Respiratory: CTA B/L; no wheezes/crackles/rales auscultated w/ good air movement  Cardiovascular: Regular rhythm/rate; S1/S2; no gallops or murmurs auscultated  Gastrointestinal: Soft; NTND w/out rebound tenderness or guarding; bowel sounds normal  Extremities: WWP; no edema or cyanosis; radial/pedal pulses palpable  Neurological:  CNII-XII grossly intact; no obvious focal deficits    MEDICATIONS  (STANDING):  ALBUTerol/ipratropium for Nebulization 3 milliLiter(s) Nebulizer every 6 hours  amLODIPine   Tablet 5 milliGRAM(s) Oral daily  azithromycin  IVPB 500 milliGRAM(s) IV Intermittent every 24 hours  cefTRIAXone   IVPB 1 Gram(s) IV Intermittent every 24 hours  heparin  Injectable 5000 Unit(s) SubCutaneous every 8 hours  hydrochlorothiazide 25 milliGRAM(s) Oral daily  losartan 50 milliGRAM(s) Oral daily    MEDICATIONS  (PRN):      LABS                        13.4   10.9  )-----------( 387      ( 22 Feb 2018 21:29 )             38.8     02-22    135  |  95<L>  |  18  ----------------------------<  103<H>  4.2   |  25  |  0.90    Ca    10.5      22 Feb 2018 21:29    TPro  7.9  /  Alb  4.0  /  TBili  0.6  /  DBili  x   /  AST  30  /  ALT  28  /  AlkPhos  235<H>  02-22    LIVER FUNCTIONS - ( 22 Feb 2018 21:29 )  Alb: 4.0 g/dL / Pro: 7.9 g/dL / ALK PHOS: 235 U/L / ALT: 28 U/L / AST: 30 U/L / GGT: 63 U/L           CARDIAC MARKERS ( 22 Feb 2018 21:29 )  x     / <0.01 ng/mL / 47 U/L / x     / 2.8 ng/mL        IMAGING/EKG/ETC  EKG:   Xray:  CT: O/N Events:  Patient was nauseous and received one dose of zofran, no other acute events.  Subjective:  Patient seen and examined at bedside, denies shortness of breath at rest but does admit to shortness of breath when moving still, denies any other complaints at this time.  Denies chest pain, abdominal pain, vomiting or diarrhea.    VITALS  Vital Signs Last 24 Hrs  T(C): 36.7 (24 Feb 2018 05:35), Max: 37.2 (23 Feb 2018 21:30)  T(F): 98.1 (24 Feb 2018 05:35), Max: 98.9 (23 Feb 2018 21:30)  HR: 103 (24 Feb 2018 05:35) (88 - 103)  BP: 116/69 (24 Feb 2018 05:35) (116/69 - 155/79)  BP(mean): --  RR: 18 (24 Feb 2018 05:35) (18 - 18)  SpO2: 94% (24 Feb 2018 05:35) (94% - 97%)    I&O's Summary      CAPILLARY BLOOD GLUCOSE        PHYSICAL EXAM  General: A&Ox 3; Elderly female sitting in bed in NAD  Head: NC/AT;   Eyes: PERRL; EOMI; anicteric sclera  Neck: Supple; no JVD  Respiratory: Crackles on the right side auscultated w/ good air movement  Cardiovascular: Regular rhythm/rate; S1/S2; no gallops or murmurs auscultated  Gastrointestinal: Soft; NTND w/out rebound tenderness or guarding; bowel sounds normal  Extremities: WWP; no edema or cyanosis; radial/pedal pulses palpable, dry skin  Neurological:  CNII-XII grossly intact; no obvious focal deficits    MEDICATIONS  (STANDING):  ALBUTerol/ipratropium for Nebulization 3 milliLiter(s) Nebulizer every 6 hours  amLODIPine   Tablet 5 milliGRAM(s) Oral daily  azithromycin  IVPB 500 milliGRAM(s) IV Intermittent every 24 hours  cefTRIAXone   IVPB 1 Gram(s) IV Intermittent every 24 hours  heparin  Injectable 5000 Unit(s) SubCutaneous every 8 hours  hydrochlorothiazide 25 milliGRAM(s) Oral daily  losartan 50 milliGRAM(s) Oral daily    MEDICATIONS  (PRN):      LABS                        13.4   10.9  )-----------( 387      ( 22 Feb 2018 21:29 )             38.8     02-22    135  |  95<L>  |  18  ----------------------------<  103<H>  4.2   |  25  |  0.90    Ca    10.5      22 Feb 2018 21:29    TPro  7.9  /  Alb  4.0  /  TBili  0.6  /  DBili  x   /  AST  30  /  ALT  28  /  AlkPhos  235<H>  02-22    LIVER FUNCTIONS - ( 22 Feb 2018 21:29 )  Alb: 4.0 g/dL / Pro: 7.9 g/dL / ALK PHOS: 235 U/L / ALT: 28 U/L / AST: 30 U/L / GGT: 63 U/L           CARDIAC MARKERS ( 22 Feb 2018 21:29 )  x     / <0.01 ng/mL / 47 U/L / x     / 2.8 ng/mL        IMAGING/EKG/ETC  EKG:   Xray:  CT: O/N Events:  Patient was nauseous and received one dose of zofran, no other acute events.  Subjective:  Patient seen and examined at bedside, denies shortness of breath at rest but does admit to shortness of breath when moving.  Denies chest pain, abdominal pain, vomiting or diarrhea but still has little residual nausea, improved from overnight.    VITALS  Vital Signs Last 24 Hrs  T(C): 36.7 (24 Feb 2018 05:35), Max: 37.2 (23 Feb 2018 21:30)  T(F): 98.1 (24 Feb 2018 05:35), Max: 98.9 (23 Feb 2018 21:30)  HR: 103 (24 Feb 2018 05:35) (88 - 103)  BP: 116/69 (24 Feb 2018 05:35) (116/69 - 155/79)  BP(mean): --  RR: 18 (24 Feb 2018 05:35) (18 - 18)  SpO2: 94% (24 Feb 2018 05:35) (94% - 97%)    I&O's Summary      CAPILLARY BLOOD GLUCOSE        PHYSICAL EXAM  General: A&Ox 3; Elderly female sitting in bed in NAD  Head: NC/AT;   Eyes: PERRL; EOMI; anicteric sclera  Neck: Supple; no JVD  Respiratory: Crackles on the right side auscultated w/ good air movement  Cardiovascular: Regular rhythm/rate; S1/S2; no gallops or murmurs auscultated  Gastrointestinal: Soft; NTND w/out rebound tenderness or guarding; bowel sounds normal  Extremities: WWP; no edema or cyanosis; radial/pedal pulses palpable, dry skin  Neurological:  CNII-XII grossly intact; no obvious focal deficits    MEDICATIONS  (STANDING):  ALBUTerol/ipratropium for Nebulization 3 milliLiter(s) Nebulizer every 6 hours  amLODIPine   Tablet 5 milliGRAM(s) Oral daily  azithromycin  IVPB 500 milliGRAM(s) IV Intermittent every 24 hours  cefTRIAXone   IVPB 1 Gram(s) IV Intermittent every 24 hours  heparin  Injectable 5000 Unit(s) SubCutaneous every 8 hours  hydrochlorothiazide 25 milliGRAM(s) Oral daily  losartan 50 milliGRAM(s) Oral daily    MEDICATIONS  (PRN):      LABS                        13.4   10.9  )-----------( 387      ( 22 Feb 2018 21:29 )             38.8     02-22    135  |  95<L>  |  18  ----------------------------<  103<H>  4.2   |  25  |  0.90    Ca    10.5      22 Feb 2018 21:29    TPro  7.9  /  Alb  4.0  /  TBili  0.6  /  DBili  x   /  AST  30  /  ALT  28  /  AlkPhos  235<H>  02-22    LIVER FUNCTIONS - ( 22 Feb 2018 21:29 )  Alb: 4.0 g/dL / Pro: 7.9 g/dL / ALK PHOS: 235 U/L / ALT: 28 U/L / AST: 30 U/L / GGT: 63 U/L           CARDIAC MARKERS ( 22 Feb 2018 21:29 )  x     / <0.01 ng/mL / 47 U/L / x     / 2.8 ng/mL    < from: CT Chest No Cont (02.23.18 @ 11:02) >  Impression:   1.  Since exam 10/9/2017, there are new multifocal areas of consolidation   of the right middle and right lower lobes as well as scattered subpleural   groundglass opacities in the left lung. These may be infectious in   etiology. Differential diagnosis includes pulmonary edema, hemorrhage, or   diffuse alveolar damage. As there is interlobular septal thickening, the   possibility of malignancy with lymphangitic carcinomatosis should be   excluded.  2.  There are again severe emphysematous changes.  3.  No change lung nodules.   4.  Findings loss right hemithorax could be caused by atelectasis due to   bronchial wall thickening.  5.  New small right pleural effusion.  6.  There is again mild adenopathy.    < end of copied text >

## 2018-02-24 NOTE — PROGRESS NOTE ADULT - PROBLEM SELECTOR PLAN 1
- Clinically improved. No fevers, well appearing, some cough with yellow phlegm that is easy to expectorate, not requiring supplemental oxygen.  - Continue cef/azithro and duo nebs.  - Will plan for bronchoscopy on Monday. Will need CBC/BMP/PT/PTT on morning of procedure. If she continues to have significant improvement in her symptoms then we may defer the procedure after repeat imaging as an outpatient.

## 2018-02-24 NOTE — PROGRESS NOTE ADULT - SUBJECTIVE AND OBJECTIVE BOX
OVERNIGHT EVENTS:   No acute events overnight.    VITALS:  T(C): , Max: 37.2 (02-23-18 @ 21:30)  HR:  (88 - 103)  BP:  (116/69 - 155/79)  ABP: --  RR:  (18 - 18)  SpO2:  (94% - 97%)  Wt(kg): --      LABS:  Na: 135 (02-22 @ 21:29)  K: 4.2 (02-22 @ 21:29)  Cl: 95 (02-22 @ 21:29)  CO2: 25 (02-22 @ 21:29)  BUN: 18 (02-22 @ 21:29)  Cr: 0.90 (02-22 @ 21:29)  Glu: 103(02-22 @ 21:29)    Hgb: 13.4 (02-22 @ 21:29)  Hct: 38.8 (02-22 @ 21:29)  WBC: 10.9 (02-22 @ 21:29)  Plt: 387 (02-22 @ 21:29)    INR:   PTT:     IMAGING:   Recent imaging studies were reviewed.    MEDICATIONS:  ALBUTerol/ipratropium for Nebulization 3 milliLiter(s) Nebulizer every 6 hours  amLODIPine   Tablet 5 milliGRAM(s) Oral daily  azithromycin  IVPB 500 milliGRAM(s) IV Intermittent every 24 hours  cefTRIAXone   IVPB 1 Gram(s) IV Intermittent every 24 hours  heparin  Injectable 5000 Unit(s) SubCutaneous every 8 hours  hydrochlorothiazide 25 milliGRAM(s) Oral daily  losartan 50 milliGRAM(s) Oral daily    EXAMINATION:  General:  NAD  HEENT: MP: Neck is supple, No JVD  Cardio: s1s2 RRR. No murmurs.   Respiratory: Good air movement with some crackles at the right base.   Abdomen:  soft, NT  Extremities:  no edema  Skin:  warm/dry  Neuro:  awake, alert, oriented x 3, follows commands, moves all extremities

## 2018-02-24 NOTE — PROGRESS NOTE ADULT - PROBLEM SELECTOR PLAN 3
-Multiple lung nodules on previous CT scan, w/ enlarging RLL nodule.  Patient reports history of unintentional weight loss over past month, unable to quantify.   -Pulm consulted for possible bronchoscopy -Multiple lung nodules on previous CT scan.  Patient reports history of unintentional weight loss over past month, unable to quantify.   -Pulm consulted for likely bronchoscopy on 2/26

## 2018-02-25 LAB
ALBUMIN SERPL ELPH-MCNC: 3.3 G/DL — SIGNIFICANT CHANGE UP (ref 3.3–5)
ALP SERPL-CCNC: 228 U/L — HIGH (ref 40–120)
ALT FLD-CCNC: 21 U/L — SIGNIFICANT CHANGE UP (ref 10–45)
ANION GAP SERPL CALC-SCNC: 13 MMOL/L — SIGNIFICANT CHANGE UP (ref 5–17)
AST SERPL-CCNC: 28 U/L — SIGNIFICANT CHANGE UP (ref 10–40)
BILIRUB SERPL-MCNC: 0.5 MG/DL — SIGNIFICANT CHANGE UP (ref 0.2–1.2)
BUN SERPL-MCNC: 17 MG/DL — SIGNIFICANT CHANGE UP (ref 7–23)
CALCIUM SERPL-MCNC: 9.5 MG/DL — SIGNIFICANT CHANGE UP (ref 8.4–10.5)
CHLORIDE SERPL-SCNC: 97 MMOL/L — SIGNIFICANT CHANGE UP (ref 96–108)
CO2 SERPL-SCNC: 24 MMOL/L — SIGNIFICANT CHANGE UP (ref 22–31)
CREAT SERPL-MCNC: 0.98 MG/DL — SIGNIFICANT CHANGE UP (ref 0.5–1.3)
GLUCOSE SERPL-MCNC: 94 MG/DL — SIGNIFICANT CHANGE UP (ref 70–99)
HCT VFR BLD CALC: 36.8 % — SIGNIFICANT CHANGE UP (ref 34.5–45)
HGB BLD-MCNC: 12.3 G/DL — SIGNIFICANT CHANGE UP (ref 11.5–15.5)
MAGNESIUM SERPL-MCNC: 1.9 MG/DL — SIGNIFICANT CHANGE UP (ref 1.6–2.6)
MCHC RBC-ENTMCNC: 28.1 PG — SIGNIFICANT CHANGE UP (ref 27–34)
MCHC RBC-ENTMCNC: 33.4 G/DL — SIGNIFICANT CHANGE UP (ref 32–36)
MCV RBC AUTO: 84.2 FL — SIGNIFICANT CHANGE UP (ref 80–100)
PLATELET # BLD AUTO: 368 K/UL — SIGNIFICANT CHANGE UP (ref 150–400)
POTASSIUM SERPL-MCNC: 4.2 MMOL/L — SIGNIFICANT CHANGE UP (ref 3.5–5.3)
POTASSIUM SERPL-SCNC: 4.2 MMOL/L — SIGNIFICANT CHANGE UP (ref 3.5–5.3)
PROT SERPL-MCNC: 7 G/DL — SIGNIFICANT CHANGE UP (ref 6–8.3)
RBC # BLD: 4.37 M/UL — SIGNIFICANT CHANGE UP (ref 3.8–5.2)
RBC # FLD: 14.8 % — SIGNIFICANT CHANGE UP (ref 10.3–16.9)
SODIUM SERPL-SCNC: 134 MMOL/L — LOW (ref 135–145)
WBC # BLD: 8.4 K/UL — SIGNIFICANT CHANGE UP (ref 3.8–10.5)
WBC # FLD AUTO: 8.4 K/UL — SIGNIFICANT CHANGE UP (ref 3.8–10.5)

## 2018-02-25 PROCEDURE — 99232 SBSQ HOSP IP/OBS MODERATE 35: CPT | Mod: GC

## 2018-02-25 PROCEDURE — 99232 SBSQ HOSP IP/OBS MODERATE 35: CPT

## 2018-02-25 RX ORDER — ONDANSETRON 8 MG/1
4 TABLET, FILM COATED ORAL ONCE
Qty: 0 | Refills: 0 | Status: COMPLETED | OUTPATIENT
Start: 2018-02-25 | End: 2018-02-25

## 2018-02-25 RX ORDER — MAGNESIUM SULFATE 500 MG/ML
1 VIAL (ML) INJECTION ONCE
Qty: 0 | Refills: 0 | Status: COMPLETED | OUTPATIENT
Start: 2018-02-25 | End: 2018-02-25

## 2018-02-25 RX ORDER — POLYETHYLENE GLYCOL 3350 17 G/17G
17 POWDER, FOR SOLUTION ORAL ONCE
Qty: 0 | Refills: 0 | Status: COMPLETED | OUTPATIENT
Start: 2018-02-25 | End: 2018-02-25

## 2018-02-25 RX ADMIN — HEPARIN SODIUM 5000 UNIT(S): 5000 INJECTION INTRAVENOUS; SUBCUTANEOUS at 07:56

## 2018-02-25 RX ADMIN — HEPARIN SODIUM 5000 UNIT(S): 5000 INJECTION INTRAVENOUS; SUBCUTANEOUS at 13:21

## 2018-02-25 RX ADMIN — Medication 3 MILLILITER(S): at 09:12

## 2018-02-25 RX ADMIN — AZITHROMYCIN 255 MILLIGRAM(S): 500 TABLET, FILM COATED ORAL at 18:40

## 2018-02-25 RX ADMIN — Medication 3 MILLILITER(S): at 15:19

## 2018-02-25 RX ADMIN — Medication 3 MILLILITER(S): at 23:02

## 2018-02-25 RX ADMIN — AMLODIPINE BESYLATE 5 MILLIGRAM(S): 2.5 TABLET ORAL at 07:56

## 2018-02-25 RX ADMIN — POLYETHYLENE GLYCOL 3350 17 GRAM(S): 17 POWDER, FOR SOLUTION ORAL at 09:23

## 2018-02-25 RX ADMIN — Medication 100 GRAM(S): at 09:28

## 2018-02-25 RX ADMIN — LOSARTAN POTASSIUM 50 MILLIGRAM(S): 100 TABLET, FILM COATED ORAL at 07:56

## 2018-02-25 RX ADMIN — CEFTRIAXONE 100 GRAM(S): 500 INJECTION, POWDER, FOR SOLUTION INTRAMUSCULAR; INTRAVENOUS at 18:07

## 2018-02-25 RX ADMIN — HEPARIN SODIUM 5000 UNIT(S): 5000 INJECTION INTRAVENOUS; SUBCUTANEOUS at 23:02

## 2018-02-25 RX ADMIN — Medication 25 MILLIGRAM(S): at 07:56

## 2018-02-25 RX ADMIN — ONDANSETRON 4 MILLIGRAM(S): 8 TABLET, FILM COATED ORAL at 18:07

## 2018-02-25 NOTE — PROGRESS NOTE ADULT - PROBLEM SELECTOR PLAN 1
- Clinically improved. No fevers, well appearing, some cough with yellow phlegm that is easy to expectorate, not requiring supplemental oxygen.  - Continue cef/azithro and duo nebs.  - We have re-reviewed her imaging and feel the best way to approach her pulmonary nodule and lymphadenopathy and suspicion of lymphangitic spread pattern seen on CT chest is to perform a PET/CT and based on that it will help us to determine the best way to proceed with bronchoscopy.

## 2018-02-25 NOTE — PROGRESS NOTE ADULT - SUBJECTIVE AND OBJECTIVE BOX
Patient is a 83y old  Female who presents with a chief complaint of SULLIVAN (23 Feb 2018 11:15)      INTERVAL HPI/OVERNIGHT EVENTS: No acute events O/N. No complaints. Awaiting EBUS.     Review of Systems: 12 point review of systems otherwise negative      MEDICATIONS  (STANDING):  ALBUTerol/ipratropium for Nebulization 3 milliLiter(s) Nebulizer every 6 hours  amLODIPine   Tablet 5 milliGRAM(s) Oral daily  azithromycin  IVPB 500 milliGRAM(s) IV Intermittent every 24 hours  cefTRIAXone   IVPB 1 Gram(s) IV Intermittent every 24 hours  heparin  Injectable 5000 Unit(s) SubCutaneous every 8 hours  hydrochlorothiazide 25 milliGRAM(s) Oral daily  losartan 50 milliGRAM(s) Oral daily    MEDICATIONS  (PRN):      Allergies    No Known Allergies    Intolerances          Vital Signs Last 24 Hrs  T(C): 36.9 (25 Feb 2018 08:47), Max: 36.9 (25 Feb 2018 08:47)  T(F): 98.4 (25 Feb 2018 08:47), Max: 98.4 (25 Feb 2018 08:47)  HR: 80 (25 Feb 2018 08:47) (80 - 96)  BP: 124/68 (25 Feb 2018 08:47) (111/65 - 125/64)  BP(mean): --  RR: 16 (25 Feb 2018 08:47) (16 - 81)  SpO2: 94% (25 Feb 2018 08:47) (92% - 94%)  CAPILLARY BLOOD GLUCOSE            Physical Exam:    General:  Well appearing, NAD, not cachetic  HEENT:  Nonicteric, PERRLA  CV:  RRR, no murmur, no JVD  Lungs:  CTA B/L, no wheezes, rales, rhonchi  Abdomen:  Soft, non-tender, no distended  Extremities:  2+ pulses, no c/c, no edema  Skin:  Warm and dry, no rashes  :  No arreguin  No Restraints    LABS:                        12.3   8.4   )-----------( 368      ( 25 Feb 2018 07:29 )             36.8     02-25    134<L>  |  97  |  17  ----------------------------<  94  4.2   |  24  |  0.98    Ca    9.5      25 Feb 2018 07:29  Mg     1.9     02-25    TPro  7.0  /  Alb  3.3  /  TBili  0.5  /  DBili  x   /  AST  28  /  ALT  21  /  AlkPhos  228<H>  02-25            RADIOLOGY & ADDITIONAL TESTS:    ---------------------------------------------------------------------------  I personally reviewed: [  ]EKG   [  ]CXR    [  ] CT    [  ]Other  ---------------------------------------------------------------------------  PLEASE CHECK WHEN PRESENT:     [  ]Heart Failure     [  ] Acute     [  ] Acute on Chronic     [  ] Chronic  -------------------------------------------------------------------     [  ]Diastolic [HFpEF]     [  ]Systolic [HFrEF]     [  ]Combined [HFpEF & HFrEF]     [  ]Other:  -------------------------------------------------------------------  [  ]EDWARD     [  ]ATN     [  ]Reneal Medullary Necrosis     [  ]Renal Cortical Necrosis     [  ]Other Pathological Lesions:    [  ]CKD 1  [  ]CKD 2  [  ]CKD 3  [  ]CKD 4  [  ]CKD 5  [  ]Other  -------------------------------------------------------------------  [  ]Other/Unspecified:    --------------------------------------------------------------------    Abdominal Nutritional Status  [  ]Malnutrition: See Nutrition Note  [  ]Cachexia  [  ]Other:   [  ]Supplement Ordered:  [  ]Morbid Obesity (BMI >=40]

## 2018-02-25 NOTE — PROGRESS NOTE ADULT - SUBJECTIVE AND OBJECTIVE BOX
OVERNIGHT EVENTS:   No acute events overnight.    VITALS:  T(C): , Max: 36.9 (02-25-18 @ 08:47)  HR:  (80 - 96)  BP:  (111/65 - 129/68)  ABP: --  RR:  (16 - 81)  SpO2:  (93% - 96%)  Wt(kg): --      LABS:  Na: 134 (02-25 @ 07:29), 136 (02-24 @ 08:50), 135 (02-22 @ 21:29)  K: 4.2 (02-25 @ 07:29), 4.3 (02-24 @ 08:50), 4.2 (02-22 @ 21:29)  Cl: 97 (02-25 @ 07:29), 96 (02-24 @ 08:50), 95 (02-22 @ 21:29)  CO2: 24 (02-25 @ 07:29), 27 (02-24 @ 08:50), 25 (02-22 @ 21:29)  BUN: 17 (02-25 @ 07:29), 18 (02-24 @ 08:50), 18 (02-22 @ 21:29)  Cr: 0.98 (02-25 @ 07:29), 1.03 (02-24 @ 08:50), 0.90 (02-22 @ 21:29)  Glu: 94(02-25 @ 07:29), 92(02-24 @ 08:50), 103(02-22 @ 21:29)    Hgb: 12.3 (02-25 @ 07:29), 13.1 (02-24 @ 08:50), 13.4 (02-22 @ 21:29)  Hct: 36.8 (02-25 @ 07:29), 38.7 (02-24 @ 08:50), 38.8 (02-22 @ 21:29)  WBC: 8.4 (02-25 @ 07:29), 8.6 (02-24 @ 08:50), 10.9 (02-22 @ 21:29)  Plt: 368 (02-25 @ 07:29), 380 (02-24 @ 08:50), 387 (02-22 @ 21:29)    INR:   PTT:     IMAGING:   Recent imaging studies were reviewed.    MEDICATIONS:  ALBUTerol/ipratropium for Nebulization 3 milliLiter(s) Nebulizer every 6 hours  amLODIPine   Tablet 5 milliGRAM(s) Oral daily  azithromycin  IVPB 500 milliGRAM(s) IV Intermittent every 24 hours  cefTRIAXone   IVPB 1 Gram(s) IV Intermittent every 24 hours  heparin  Injectable 5000 Unit(s) SubCutaneous every 8 hours  hydrochlorothiazide 25 milliGRAM(s) Oral daily  losartan 50 milliGRAM(s) Oral daily    EXAMINATION:  General:  NAD  HEENT: MP: Neck is supple, No JVD  Cardio: s1s2 RRR. No murmurs.   Respiratory: Clear to auscultation b/l  Adomen:  soft, NT  Extremities:  no edema  Skin:  warm/dry  Neuro:  awake, alert, oriented x 3, follows commands, moves all extremities

## 2018-02-25 NOTE — PROGRESS NOTE ADULT - ASSESSMENT
83 year old female with approximately 35 pack year smoking history PMH of pulmonary nodules, emphysema, previous hx of TB, HTN who presents with worsening dyspnea on exertion for two weeks, increased sputum production and cough with imaging concerning for PNA.

## 2018-02-25 NOTE — PROGRESS NOTE ADULT - PROBLEM SELECTOR PLAN 1
-CXR and CT concerning for RML/RLL PNA.  No leukocytosis.  Patient remains afebrile, other vitals WNL.  -ceftriaxone 1g q 24 and azithromycin 500mg q 24 per pulm recs  -F/u sputum cultures  -CT chest as above  -HIV negative  -Pulm consulted, patient for bronchoscopy Monday

## 2018-02-25 NOTE — PROGRESS NOTE ADULT - ASSESSMENT
82 yo F, PMH of COPD, multiple lung nodules, remote history of TB as a child, now with multilobar pneumonia.

## 2018-02-26 LAB
ALBUMIN SERPL ELPH-MCNC: 3.4 G/DL — SIGNIFICANT CHANGE UP (ref 3.3–5)
ALP SERPL-CCNC: 232 U/L — HIGH (ref 40–120)
ALT FLD-CCNC: 20 U/L — SIGNIFICANT CHANGE UP (ref 10–45)
ANION GAP SERPL CALC-SCNC: 11 MMOL/L — SIGNIFICANT CHANGE UP (ref 5–17)
APTT BLD: 28.2 SEC — SIGNIFICANT CHANGE UP (ref 27.5–37.4)
AST SERPL-CCNC: 23 U/L — SIGNIFICANT CHANGE UP (ref 10–40)
BILIRUB SERPL-MCNC: 0.4 MG/DL — SIGNIFICANT CHANGE UP (ref 0.2–1.2)
BUN SERPL-MCNC: 15 MG/DL — SIGNIFICANT CHANGE UP (ref 7–23)
CALCIUM SERPL-MCNC: 9.6 MG/DL — SIGNIFICANT CHANGE UP (ref 8.4–10.5)
CHLORIDE SERPL-SCNC: 96 MMOL/L — SIGNIFICANT CHANGE UP (ref 96–108)
CO2 SERPL-SCNC: 28 MMOL/L — SIGNIFICANT CHANGE UP (ref 22–31)
CREAT SERPL-MCNC: 1.02 MG/DL — SIGNIFICANT CHANGE UP (ref 0.5–1.3)
GLUCOSE BLDC GLUCOMTR-MCNC: 93 MG/DL — SIGNIFICANT CHANGE UP (ref 70–99)
GLUCOSE SERPL-MCNC: 98 MG/DL — SIGNIFICANT CHANGE UP (ref 70–99)
HCT VFR BLD CALC: 35.9 % — SIGNIFICANT CHANGE UP (ref 34.5–45)
HGB BLD-MCNC: 12.2 G/DL — SIGNIFICANT CHANGE UP (ref 11.5–15.5)
INR BLD: 1.05 — SIGNIFICANT CHANGE UP (ref 0.88–1.16)
MAGNESIUM SERPL-MCNC: 2.1 MG/DL — SIGNIFICANT CHANGE UP (ref 1.6–2.6)
MCHC RBC-ENTMCNC: 28.8 PG — SIGNIFICANT CHANGE UP (ref 27–34)
MCHC RBC-ENTMCNC: 34 G/DL — SIGNIFICANT CHANGE UP (ref 32–36)
MCV RBC AUTO: 84.7 FL — SIGNIFICANT CHANGE UP (ref 80–100)
PLATELET # BLD AUTO: 389 K/UL — SIGNIFICANT CHANGE UP (ref 150–400)
POTASSIUM SERPL-MCNC: 4.7 MMOL/L — SIGNIFICANT CHANGE UP (ref 3.5–5.3)
POTASSIUM SERPL-SCNC: 4.7 MMOL/L — SIGNIFICANT CHANGE UP (ref 3.5–5.3)
PROT SERPL-MCNC: 7.1 G/DL — SIGNIFICANT CHANGE UP (ref 6–8.3)
PROTHROM AB SERPL-ACNC: 11.7 SEC — SIGNIFICANT CHANGE UP (ref 9.8–12.7)
RBC # BLD: 4.24 M/UL — SIGNIFICANT CHANGE UP (ref 3.8–5.2)
RBC # FLD: 14.7 % — SIGNIFICANT CHANGE UP (ref 10.3–16.9)
SODIUM SERPL-SCNC: 135 MMOL/L — SIGNIFICANT CHANGE UP (ref 135–145)
WBC # BLD: 8.1 K/UL — SIGNIFICANT CHANGE UP (ref 3.8–10.5)
WBC # FLD AUTO: 8.1 K/UL — SIGNIFICANT CHANGE UP (ref 3.8–10.5)

## 2018-02-26 PROCEDURE — 99232 SBSQ HOSP IP/OBS MODERATE 35: CPT

## 2018-02-26 PROCEDURE — 99233 SBSQ HOSP IP/OBS HIGH 50: CPT

## 2018-02-26 PROCEDURE — 78815 PET IMAGE W/CT SKULL-THIGH: CPT | Mod: 26

## 2018-02-26 RX ORDER — SIMETHICONE 80 MG/1
80 TABLET, CHEWABLE ORAL ONCE
Qty: 0 | Refills: 0 | Status: COMPLETED | OUTPATIENT
Start: 2018-02-26 | End: 2018-02-26

## 2018-02-26 RX ADMIN — Medication 3 MILLILITER(S): at 10:16

## 2018-02-26 RX ADMIN — HEPARIN SODIUM 5000 UNIT(S): 5000 INJECTION INTRAVENOUS; SUBCUTANEOUS at 07:18

## 2018-02-26 RX ADMIN — Medication 3 MILLILITER(S): at 17:58

## 2018-02-26 RX ADMIN — AMLODIPINE BESYLATE 5 MILLIGRAM(S): 2.5 TABLET ORAL at 07:18

## 2018-02-26 RX ADMIN — SIMETHICONE 80 MILLIGRAM(S): 80 TABLET, CHEWABLE ORAL at 10:17

## 2018-02-26 RX ADMIN — HEPARIN SODIUM 5000 UNIT(S): 5000 INJECTION INTRAVENOUS; SUBCUTANEOUS at 22:30

## 2018-02-26 RX ADMIN — HEPARIN SODIUM 5000 UNIT(S): 5000 INJECTION INTRAVENOUS; SUBCUTANEOUS at 17:59

## 2018-02-26 RX ADMIN — Medication 25 MILLIGRAM(S): at 07:17

## 2018-02-26 RX ADMIN — Medication 3 MILLILITER(S): at 22:30

## 2018-02-26 RX ADMIN — Medication 3 MILLILITER(S): at 07:17

## 2018-02-26 RX ADMIN — CEFTRIAXONE 100 GRAM(S): 500 INJECTION, POWDER, FOR SOLUTION INTRAMUSCULAR; INTRAVENOUS at 18:16

## 2018-02-26 RX ADMIN — LOSARTAN POTASSIUM 50 MILLIGRAM(S): 100 TABLET, FILM COATED ORAL at 07:17

## 2018-02-26 RX ADMIN — AZITHROMYCIN 255 MILLIGRAM(S): 500 TABLET, FILM COATED ORAL at 19:37

## 2018-02-26 NOTE — PROGRESS NOTE ADULT - ATTENDING COMMENTS
Pt seen and examined, VSS, exam with crackles scattered throughout, minimal LE edema, normal nails, labs and imaging reviewed.    Agree with assessment as above with some changes  1. Emphysema/COPD  2. Pulmonary nodules with possible cancer  3. Lung scarring  4. TB, s/p tx  - good response to abx in that pt with improvement in sputum production and color and less SOB  - for PET scan today and bronchoscopy pending results  - rest of plan as above

## 2018-02-26 NOTE — PROGRESS NOTE ADULT - PROBLEM SELECTOR PLAN 2
stable in size.   followed by Dr Thornton. -Patient saturating 98% on RA at rest, desaturating to 86% while ambulating.  Denies previous cardiac hx.  BNP slightly elevated to 634, however, previous BNP unavailable for reference.  Etiology likely 2/2 PNA in setting of severe emphysema.   -CT chest results as above  -Echocardiogram shows EF 60-65% and no diastolic dysfunction  -Duoneb q 6

## 2018-02-26 NOTE — PROGRESS NOTE ADULT - SUBJECTIVE AND OBJECTIVE BOX
Interval Events:  Patient seen and examined at bedside. From a respiratory standpoint the pt feels well, still has a mildly productive cough but feels it continues to improve, sputum is light yellow. She has "gas pain" that she feels is impeding on her breathing.        MEDICATIONS:  Pulmonary:  ALBUTerol/ipratropium for Nebulization 3 milliLiter(s) Nebulizer every 6 hours    Antimicrobials:  azithromycin  IVPB 500 milliGRAM(s) IV Intermittent every 24 hours  cefTRIAXone   IVPB 1 Gram(s) IV Intermittent every 24 hours    Anticoagulants:  heparin  Injectable 5000 Unit(s) SubCutaneous every 8 hours    Cardiac:  amLODIPine   Tablet 5 milliGRAM(s) Oral daily  hydrochlorothiazide 25 milliGRAM(s) Oral daily  losartan 50 milliGRAM(s) Oral daily      Allergies    No Known Allergies    Intolerances        Vital Signs Last 24 Hrs  T(C): 37 (26 Feb 2018 05:10), Max: 37 (26 Feb 2018 05:10)  T(F): 98.6 (26 Feb 2018 05:10), Max: 98.6 (26 Feb 2018 05:10)  HR: 83 (26 Feb 2018 05:10) (79 - 84)  BP: 123/72 (26 Feb 2018 05:10) (123/72 - 134/70)  BP(mean): --  RR: 18 (26 Feb 2018 05:10) (16 - 18)  SpO2: 94% (26 Feb 2018 05:10) (94% - 96%)        PHYSICAL EXAM:    General: Well developed; well nourished; in no acute distress  Eyes: PERRL, EOM intact; conjunctiva and sclera clear  ENMT: No nasal discharge; airway clear  Respiratory: mild mid to end expiratory wheezes B/L  Cardiovascular: Regular rate and rhythm. S1 and S2 Normal; No murmurs, gallops or rubs  Extremities:  No clubbing, cyanosis or edema  Neurological: Alert and oriented x3    LABS:      CBC Full  -  ( 26 Feb 2018 07:45 )  WBC Count : 8.1 K/uL  Hemoglobin : 12.2 g/dL  Hematocrit : 35.9 %  Platelet Count - Automated : 389 K/uL  Mean Cell Volume : 84.7 fL  Mean Cell Hemoglobin : 28.8 pg  Mean Cell Hemoglobin Concentration : 34.0 g/dL      02-25    134<L>  |  97  |  17  ----------------------------<  94  4.2   |  24  |  0.98    Ca    9.5      25 Feb 2018 07:29  Mg     1.9     02-25    TPro  7.0  /  Alb  3.3  /  TBili  0.5  /  DBili  x   /  AST  28  /  ALT  21  /  AlkPhos  228<H>  02-25    PT/INR - ( 26 Feb 2018 07:45 )   PT: 11.7 sec;   INR: 1.05          PTT - ( 26 Feb 2018 07:45 )  PTT:28.2 sec                  RADIOLOGY & ADDITIONAL STUDIES (The following images were personally reviewed):

## 2018-02-26 NOTE — PROGRESS NOTE ADULT - PROBLEM SELECTOR PLAN 1
- Clinically improved. No fevers, well appearing, some cough with yellow phlegm that is easy to expectorate, not requiring supplemental oxygen.  - Continue cef/azithro for a total of 7 days  - continue duo nebs.  - We have re-reviewed her imaging and feel the best way to approach her pulmonary nodule and lymphadenopathy and suspicion of lymphangitic spread pattern seen on CT chest is to perform a PET/CT and based on that it will help us to determine the best way to proceed with bronchoscopy. Plan is for PET today.

## 2018-02-26 NOTE — PROGRESS NOTE ADULT - ATTENDING COMMENTS
Agree with evaluation. I am concerned that patient has lymphangetic spread in the RUL. We will get a PET scan today and consider a tissue diagnosis subsequently.  Remains on Ceftriaxone and Azithromycin

## 2018-02-26 NOTE — PROGRESS NOTE ADULT - PROBLEM SELECTOR PLAN 3
-Multiple lung nodules on previous CT scan.  Patient reports history of unintentional weight loss over past month, unable to quantify.   -Pulm consulted, patient for PET scan today.

## 2018-02-26 NOTE — PROGRESS NOTE ADULT - SUBJECTIVE AND OBJECTIVE BOX
O/N Events:  No acute events overnight  Subjective:  Patient seen and examined at bedside    VITALS  Vital Signs Last 24 Hrs  T(C): 37 (26 Feb 2018 05:10), Max: 37 (26 Feb 2018 05:10)  T(F): 98.6 (26 Feb 2018 05:10), Max: 98.6 (26 Feb 2018 05:10)  HR: 83 (26 Feb 2018 05:10) (79 - 84)  BP: 123/72 (26 Feb 2018 05:10) (123/72 - 134/70)  BP(mean): --  RR: 18 (26 Feb 2018 05:10) (16 - 18)  SpO2: 94% (26 Feb 2018 05:10) (94% - 96%)    I&O's Summary      CAPILLARY BLOOD GLUCOSE      PHYSICAL EXAM  General: A&Ox 3; Elderly female sitting in bed in NAD  Head: NC/AT;   Eyes: PERRL; EOMI; anicteric sclera  Neck: Supple; no JVD  Respiratory: Crackles on the right side auscultated w/ good air movement  Cardiovascular: Regular rhythm/rate; S1/S2; no gallops or murmurs auscultated  Gastrointestinal: Soft; NTND w/out rebound tenderness or guarding; bowel sounds normal  Extremities: WWP; no edema or cyanosis; radial/pedal pulses palpable, dry skin  Neurological:  CNII-XII grossly intact; no obvious focal deficits    MEDICATIONS  (STANDING):  ALBUTerol/ipratropium for Nebulization 3 milliLiter(s) Nebulizer every 6 hours  amLODIPine   Tablet 5 milliGRAM(s) Oral daily  azithromycin  IVPB 500 milliGRAM(s) IV Intermittent every 24 hours  cefTRIAXone   IVPB 1 Gram(s) IV Intermittent every 24 hours  heparin  Injectable 5000 Unit(s) SubCutaneous every 8 hours  hydrochlorothiazide 25 milliGRAM(s) Oral daily  losartan 50 milliGRAM(s) Oral daily    MEDICATIONS  (PRN):      LABS                        12.3   8.4   )-----------( 368      ( 25 Feb 2018 07:29 )             36.8     02-25    134<L>  |  97  |  17  ----------------------------<  94  4.2   |  24  |  0.98    Ca    9.5      25 Feb 2018 07:29  Mg     1.9     02-25    TPro  7.0  /  Alb  3.3  /  TBili  0.5  /  DBili  x   /  AST  28  /  ALT  21  /  AlkPhos  228<H>  02-25    LIVER FUNCTIONS - ( 25 Feb 2018 07:29 )  Alb: 3.3 g/dL / Pro: 7.0 g/dL / ALK PHOS: 228 U/L / ALT: 21 U/L / AST: 28 U/L / GGT: x                     IMAGING/EKG/ETC  EKG:   Xray:  CT: O/N Events:  No acute events overnight  Subjective:  Patient seen and examined at bedside, denies any chest pain or shortness of breath.  Admits to abdominal pain 2/2 gas.  No other complaints at this time.    VITALS  Vital Signs Last 24 Hrs  T(C): 37 (26 Feb 2018 05:10), Max: 37 (26 Feb 2018 05:10)  T(F): 98.6 (26 Feb 2018 05:10), Max: 98.6 (26 Feb 2018 05:10)  HR: 83 (26 Feb 2018 05:10) (79 - 84)  BP: 123/72 (26 Feb 2018 05:10) (123/72 - 134/70)  BP(mean): --  RR: 18 (26 Feb 2018 05:10) (16 - 18)  SpO2: 94% (26 Feb 2018 05:10) (94% - 96%)    I&O's Summary      CAPILLARY BLOOD GLUCOSE      PHYSICAL EXAM  General: A&Ox 3; Elderly female sitting in bed in NAD  Head: NC/AT;   Eyes: PERRL; EOMI; anicteric sclera  Neck: Supple; no JVD  Respiratory: Clear to auscultation bilaterally  Cardiovascular: Regular rhythm/rate; S1/S2; no gallops or murmurs auscultated  Gastrointestinal: Soft; NTND w/out rebound tenderness or guarding; bowel sounds normal  Extremities: WWP; no edema or cyanosis; radial/pedal pulses palpable, dry skin  Neurological:  CNII-XII grossly intact; no obvious focal deficits    MEDICATIONS  (STANDING):  ALBUTerol/ipratropium for Nebulization 3 milliLiter(s) Nebulizer every 6 hours  amLODIPine   Tablet 5 milliGRAM(s) Oral daily  azithromycin  IVPB 500 milliGRAM(s) IV Intermittent every 24 hours  cefTRIAXone   IVPB 1 Gram(s) IV Intermittent every 24 hours  heparin  Injectable 5000 Unit(s) SubCutaneous every 8 hours  hydrochlorothiazide 25 milliGRAM(s) Oral daily  losartan 50 milliGRAM(s) Oral daily    MEDICATIONS  (PRN):      LABS                        12.3   8.4   )-----------( 368      ( 25 Feb 2018 07:29 )             36.8     02-25    134<L>  |  97  |  17  ----------------------------<  94  4.2   |  24  |  0.98    Ca    9.5      25 Feb 2018 07:29  Mg     1.9     02-25    TPro  7.0  /  Alb  3.3  /  TBili  0.5  /  DBili  x   /  AST  28  /  ALT  21  /  AlkPhos  228<H>  02-25    LIVER FUNCTIONS - ( 25 Feb 2018 07:29 )  Alb: 3.3 g/dL / Pro: 7.0 g/dL / ALK PHOS: 228 U/L / ALT: 21 U/L / AST: 28 U/L / GGT: x      < from: CT Chest No Cont (02.23.18 @ 11:02) >  Impression:   1.  Since exam 10/9/2017, there are new multifocal areas of consolidation   of the right middle and right lower lobes as well as scattered subpleural   groundglass opacities in the left lung. These may be infectious in   etiology. Differential diagnosis includes pulmonary edema, hemorrhage, or   diffuse alveolar damage. As there is interlobular septal thickening, the   possibility of malignancy with lymphangitic carcinomatosis should be   excluded.  2.  There are again severe emphysematous changes.  3.  No change lung nodules.   4.  Findings loss right hemithorax could be caused by atelectasis due to   bronchial wall thickening.  5.  New small right pleural effusion.  6.  There is again mild adenopathy.    < end of copied text >

## 2018-02-26 NOTE — PROGRESS NOTE ADULT - PROBLEM SELECTOR PLAN 1
- Clinically improved. No fevers, well appearing, some cough with yellow phlegm that is easy to expectorate, not requiring supplemental oxygen.  - Continue cef/azithro and duo nebs.  - Will plan for bronchoscopy on Monday. Will need CBC/BMP/PT/PTT on morning of procedure. If she continues to have significant improvement in her symptoms then we may defer the procedure after repeat imaging as an outpatient. -CXR and CT concerning for RML/RLL PNA.  No leukocytosis.  Patient remains afebrile, other vitals WNL.  -Ceftriaxone 1g q 24 and azithromycin 500mg q 24 per pulm recs  -F/u sputum cultures  -CT chest results above  -HIV negative  -Pulm consulted, patient for PET scan today

## 2018-02-27 DIAGNOSIS — C79.9 SECONDARY MALIGNANT NEOPLASM OF UNSPECIFIED SITE: ICD-10-CM

## 2018-02-27 LAB
ALBUMIN SERPL ELPH-MCNC: 3.3 G/DL — SIGNIFICANT CHANGE UP (ref 3.3–5)
ALP SERPL-CCNC: 236 U/L — HIGH (ref 40–120)
ALT FLD-CCNC: 27 U/L — SIGNIFICANT CHANGE UP (ref 10–45)
ANION GAP SERPL CALC-SCNC: 12 MMOL/L — SIGNIFICANT CHANGE UP (ref 5–17)
AST SERPL-CCNC: 36 U/L — SIGNIFICANT CHANGE UP (ref 10–40)
BILIRUB SERPL-MCNC: 0.5 MG/DL — SIGNIFICANT CHANGE UP (ref 0.2–1.2)
BUN SERPL-MCNC: 18 MG/DL — SIGNIFICANT CHANGE UP (ref 7–23)
CALCIUM SERPL-MCNC: 9.2 MG/DL — SIGNIFICANT CHANGE UP (ref 8.4–10.5)
CHLORIDE SERPL-SCNC: 94 MMOL/L — LOW (ref 96–108)
CO2 SERPL-SCNC: 25 MMOL/L — SIGNIFICANT CHANGE UP (ref 22–31)
CREAT SERPL-MCNC: 1.09 MG/DL — SIGNIFICANT CHANGE UP (ref 0.5–1.3)
GLUCOSE SERPL-MCNC: 104 MG/DL — HIGH (ref 70–99)
HCT VFR BLD CALC: 34.4 % — LOW (ref 34.5–45)
HGB BLD-MCNC: 11.6 G/DL — SIGNIFICANT CHANGE UP (ref 11.5–15.5)
MAGNESIUM SERPL-MCNC: 1.9 MG/DL — SIGNIFICANT CHANGE UP (ref 1.6–2.6)
MCHC RBC-ENTMCNC: 28.6 PG — SIGNIFICANT CHANGE UP (ref 27–34)
MCHC RBC-ENTMCNC: 33.7 G/DL — SIGNIFICANT CHANGE UP (ref 32–36)
MCV RBC AUTO: 84.7 FL — SIGNIFICANT CHANGE UP (ref 80–100)
PLATELET # BLD AUTO: 361 K/UL — SIGNIFICANT CHANGE UP (ref 150–400)
POTASSIUM SERPL-MCNC: 4.2 MMOL/L — SIGNIFICANT CHANGE UP (ref 3.5–5.3)
POTASSIUM SERPL-SCNC: 4.2 MMOL/L — SIGNIFICANT CHANGE UP (ref 3.5–5.3)
PROT SERPL-MCNC: 6.9 G/DL — SIGNIFICANT CHANGE UP (ref 6–8.3)
RBC # BLD: 4.06 M/UL — SIGNIFICANT CHANGE UP (ref 3.8–5.2)
RBC # FLD: 14.8 % — SIGNIFICANT CHANGE UP (ref 10.3–16.9)
SODIUM SERPL-SCNC: 131 MMOL/L — LOW (ref 135–145)
WBC # BLD: 7.8 K/UL — SIGNIFICANT CHANGE UP (ref 3.8–10.5)
WBC # FLD AUTO: 7.8 K/UL — SIGNIFICANT CHANGE UP (ref 3.8–10.5)

## 2018-02-27 PROCEDURE — 99232 SBSQ HOSP IP/OBS MODERATE 35: CPT

## 2018-02-27 PROCEDURE — 99233 SBSQ HOSP IP/OBS HIGH 50: CPT

## 2018-02-27 RX ORDER — SIMETHICONE 80 MG/1
80 TABLET, CHEWABLE ORAL ONCE
Qty: 0 | Refills: 0 | Status: COMPLETED | OUTPATIENT
Start: 2018-02-27 | End: 2018-02-27

## 2018-02-27 RX ADMIN — HEPARIN SODIUM 5000 UNIT(S): 5000 INJECTION INTRAVENOUS; SUBCUTANEOUS at 23:13

## 2018-02-27 RX ADMIN — AMLODIPINE BESYLATE 5 MILLIGRAM(S): 2.5 TABLET ORAL at 07:04

## 2018-02-27 RX ADMIN — LOSARTAN POTASSIUM 50 MILLIGRAM(S): 100 TABLET, FILM COATED ORAL at 07:04

## 2018-02-27 RX ADMIN — Medication 3 MILLILITER(S): at 12:14

## 2018-02-27 RX ADMIN — SIMETHICONE 80 MILLIGRAM(S): 80 TABLET, CHEWABLE ORAL at 19:49

## 2018-02-27 RX ADMIN — Medication 3 MILLILITER(S): at 19:13

## 2018-02-27 RX ADMIN — Medication 25 MILLIGRAM(S): at 07:04

## 2018-02-27 RX ADMIN — HEPARIN SODIUM 5000 UNIT(S): 5000 INJECTION INTRAVENOUS; SUBCUTANEOUS at 07:05

## 2018-02-27 RX ADMIN — AZITHROMYCIN 255 MILLIGRAM(S): 500 TABLET, FILM COATED ORAL at 19:14

## 2018-02-27 RX ADMIN — Medication 3 MILLILITER(S): at 07:04

## 2018-02-27 RX ADMIN — CEFTRIAXONE 100 GRAM(S): 500 INJECTION, POWDER, FOR SOLUTION INTRAMUSCULAR; INTRAVENOUS at 19:16

## 2018-02-27 RX ADMIN — Medication 3 MILLILITER(S): at 23:13

## 2018-02-27 NOTE — DIETITIAN INITIAL EVALUATION ADULT. - ENERGY NEEDS
Height 64"; .5#; #; 113%IBW  BMI 23.2  ABW used for calculations as pt between % of IBW. Needs adjusted for age

## 2018-02-27 NOTE — PROGRESS NOTE ADULT - PROBLEM SELECTOR PLAN 4
-With elevated GGT.  No RUQ tenderness.  -Continue to monitor with CMP -With elevated GGT.  No RUQ tenderness.  Will follow up PET scan results.  -Continue to monitor with CMP

## 2018-02-27 NOTE — PROGRESS NOTE ADULT - SUBJECTIVE AND OBJECTIVE BOX
Interval Events:  Patient seen and examined at bedside.    MEDICATIONS:  Pulmonary:  ALBUTerol/ipratropium for Nebulization 3 milliLiter(s) Nebulizer every 6 hours    Antimicrobials:  azithromycin  IVPB 500 milliGRAM(s) IV Intermittent every 24 hours  cefTRIAXone   IVPB 1 Gram(s) IV Intermittent every 24 hours    Anticoagulants:  heparin  Injectable 5000 Unit(s) SubCutaneous every 8 hours    Cardiac:  amLODIPine   Tablet 5 milliGRAM(s) Oral daily  hydrochlorothiazide 25 milliGRAM(s) Oral daily  losartan 50 milliGRAM(s) Oral daily      Allergies    No Known Allergies    Intolerances        Vital Signs Last 24 Hrs  T(C): 37.1 (27 Feb 2018 04:41), Max: 37.1 (27 Feb 2018 04:41)  T(F): 98.7 (27 Feb 2018 04:41), Max: 98.7 (27 Feb 2018 04:41)  HR: 89 (27 Feb 2018 04:41) (87 - 89)  BP: 125/61 (27 Feb 2018 04:41) (125/61 - 130/69)  BP(mean): 84 (26 Feb 2018 16:07) (84 - 84)  RR: 18 (27 Feb 2018 04:41) (18 - 19)  SpO2: 97% (27 Feb 2018 04:41) (93% - 97%)        LABS:      CBC Full  -  ( 27 Feb 2018 07:05 )  WBC Count : 7.8 K/uL  Hemoglobin : 11.6 g/dL  Hematocrit : 34.4 %  Platelet Count - Automated : 361 K/uL  Mean Cell Volume : 84.7 fL  Mean Cell Hemoglobin : 28.6 pg  Mean Cell Hemoglobin Concentration : 33.7 g/dL  Auto Neutrophil # : x  Auto Lymphocyte # : x  Auto Monocyte # : x  Auto Eosinophil # : x  Auto Basophil # : x  Auto Neutrophil % : x  Auto Lymphocyte % : x  Auto Monocyte % : x  Auto Eosinophil % : x  Auto Basophil % : x    02-27    131<L>  |  94<L>  |  18  ----------------------------<  104<H>  4.2   |  25  |  1.09    Ca    9.2      27 Feb 2018 07:05  Mg     1.9     02-27    TPro  6.9  /  Alb  3.3  /  TBili  0.5  /  DBili  x   /  AST  36  /  ALT  27  /  AlkPhos  236<H>  02-27    PT/INR - ( 26 Feb 2018 07:45 )   PT: 11.7 sec;   INR: 1.05          PTT - ( 26 Feb 2018 07:45 )  PTT:28.2 sec                  RADIOLOGY & ADDITIONAL STUDIES (The following images were personally reviewed): Interval Events:  Patient seen and examined at bedside. She continues to feel well, went for the PET scan yesterday. Reviewed results with pt regarding likely metastatic lung ca, she understands and has no further questions at this time.       MEDICATIONS:  Pulmonary:  ALBUTerol/ipratropium for Nebulization 3 milliLiter(s) Nebulizer every 6 hours    Antimicrobials:  azithromycin  IVPB 500 milliGRAM(s) IV Intermittent every 24 hours  cefTRIAXone   IVPB 1 Gram(s) IV Intermittent every 24 hours    Anticoagulants:  heparin  Injectable 5000 Unit(s) SubCutaneous every 8 hours    Cardiac:  amLODIPine   Tablet 5 milliGRAM(s) Oral daily  hydrochlorothiazide 25 milliGRAM(s) Oral daily  losartan 50 milliGRAM(s) Oral daily      Allergies    No Known Allergies    Intolerances        Vital Signs Last 24 Hrs  T(C): 37.1 (27 Feb 2018 04:41), Max: 37.1 (27 Feb 2018 04:41)  T(F): 98.7 (27 Feb 2018 04:41), Max: 98.7 (27 Feb 2018 04:41)  HR: 89 (27 Feb 2018 04:41) (87 - 89)  BP: 125/61 (27 Feb 2018 04:41) (125/61 - 130/69)  BP(mean): 84 (26 Feb 2018 16:07) (84 - 84)  RR: 18 (27 Feb 2018 04:41) (18 - 19)  SpO2: 97% (27 Feb 2018 04:41) (93% - 97%)    Physical exam  General - NAD, sitting up comfortably in bed  HEENT - MMM, EOMI, PERRL  Neck - no JVD  Chest - trace mid to end expiratory wheezes in RLL  Heart - S1S2 RRR no M/R/G  Abd - soft, nontender  Ext - no C/C/E    LABS:      CBC Full  -  ( 27 Feb 2018 07:05 )  WBC Count : 7.8 K/uL  Hemoglobin : 11.6 g/dL  Hematocrit : 34.4 %  Platelet Count - Automated : 361 K/uL  Mean Cell Volume : 84.7 fL  Mean Cell Hemoglobin : 28.6 pg  Mean Cell Hemoglobin Concentration : 33.7 g/dL      02-27    131<L>  |  94<L>  |  18  ----------------------------<  104<H>  4.2   |  25  |  1.09    Ca    9.2      27 Feb 2018 07:05  Mg     1.9     02-27    TPro  6.9  /  Alb  3.3  /  TBili  0.5  /  DBili  x   /  AST  36  /  ALT  27  /  AlkPhos  236<H>  02-27    PT/INR - ( 26 Feb 2018 07:45 )   PT: 11.7 sec;   INR: 1.05          PTT - ( 26 Feb 2018 07:45 )  PTT:28.2 sec                  RADIOLOGY & ADDITIONAL STUDIES (The following images were personally reviewed):  < from: NM PET/CT Onc FDG Skull to Thigh, Inital (02.26.18 @ 15:28) >  There are multiple hypermetabolic, sclerotic osseous lesions, consistent   with metastatic disease. These lesions are present throughout the   thoracolumbar spine, in the sacrum, in the right ilium, and in the left   superior pubic ramus. Status post left total knee replacement.    Impression: 1. Increased activity within consolidated right middle lobe   and within bilateral lung nodules, suspicious for lung cancer.    2. Increased activity within rind of pleural thickening right hemithorax,   consistent with metastatic disease.    3. Hypermetabolic left supraclavicular, thoracic and abdominal lymph   nodes, consistent with metastatic disease.    4. Hypermetabolic liver lesions, consistent with hepatic metastatic   disease.    5. Hypermetabolic osseous lesions, consistent with metastatic disease.    6. Interlobular septal thickening right lung, consistent with   lymphangitic carcinomatosis.    7. Hypermetabolic, peripherally located areas of groundglass opacity with   "reverse halo" appearances in left lung. These could represent additional   sites of malignancy versus organizing pneumonia.    < end of copied text >

## 2018-02-27 NOTE — DIETITIAN INITIAL EVALUATION ADULT. - OTHER INFO
82 yo/female with PMHx pulmonary nodules, emphysema, TB, HTN, admitted with SOB/SULLIVAN and underlying PNA. Pt is s/p PET scan for nodule imaging, revealing interest in mediastinal LN, liver mets and heriberto mets. Pt seen in room, awake, alert, very pleasant. Pt endorses good intake at home, cooks for self, did not elaborate on types of foods consumed. Currently w/good intake as well- 75% intake at breakfast endorsed. No N/V/C/D reported at this time. Last BM 2/27. NKFA. No difficulty chewing or swallowing. Skin intact, boris 20. No pain currently. Heart healthy nutrition therapy handout provided, pt w/o questions at this time.

## 2018-02-27 NOTE — DIETITIAN INITIAL EVALUATION ADULT. - PROBLEM SELECTOR PLAN 4
w/ elevated GGT.  No RUQ tenderness.  Pt has not previously had a cholecystectomy.   - Continue to monitor

## 2018-02-27 NOTE — PROGRESS NOTE ADULT - PROBLEM SELECTOR PLAN 2
-Patient saturating 98% on RA at rest, desaturating to 86% while ambulating.  Denies previous cardiac hx.  BNP slightly elevated to 634, however, previous BNP unavailable for reference.  Etiology likely 2/2 PNA in setting of severe emphysema.   -CT chest results as above  -Echocardiogram shows EF 60-65% and no diastolic dysfunction  -Duoneb q 6 -Patient saturating 98% on RA at rest, desaturating to 86% while ambulating, including with PT on 2/26/18.  Denies previous cardiac hx.  BNP slightly elevated to 634, however, previous BNP unavailable for reference.  Etiology likely 2/2 PNA in setting of severe emphysema.  -CT chest results as above  -Echocardiogram shows EF 60-65% and no diastolic dysfunction  -Duoneb q 6  -PET scan performed, awaiting final results prior to pulmonary team intervention.

## 2018-02-27 NOTE — PROGRESS NOTE ADULT - PROBLEM SELECTOR PLAN 3
-Multiple lung nodules on previous CT scan.  Patient reports history of unintentional weight loss over past month, unable to quantify.   -Pulm consulted, patient for PET scan today. -Multiple lung nodules on previous CT scan.  Patient reports history of unintentional weight loss over past month, unable to quantify.   -PET scan performed, awaiting final results prior to pulmonary team intervention.

## 2018-02-27 NOTE — PROGRESS NOTE ADULT - PROBLEM SELECTOR PLAN 2
- Evidence of disease within lung parenchyma as well as liver and bone  - Recommend heme/onc consult and consider biopsy of the lesion furthest from the primary (bone), will also plan for bronchoscopy with BAL and linear EBUS for LN biopsy tomorrow at 730. Please keep NPO after midnight

## 2018-02-27 NOTE — PROGRESS NOTE ADULT - SUBJECTIVE AND OBJECTIVE BOX
O/N Events:  No acute events overnight  Subjective:  Patient seen and examined at bedside.     VITALS  Vital Signs Last 24 Hrs  T(C): 37.1 (27 Feb 2018 04:41), Max: 37.1 (27 Feb 2018 04:41)  T(F): 98.7 (27 Feb 2018 04:41), Max: 98.7 (27 Feb 2018 04:41)  HR: 89 (27 Feb 2018 04:41) (84 - 89)  BP: 125/61 (27 Feb 2018 04:41) (125/61 - 130/69)  BP(mean): 84 (26 Feb 2018 16:07) (84 - 84)  RR: 18 (27 Feb 2018 04:41) (17 - 19)  SpO2: 97% (27 Feb 2018 04:41) (93% - 97%)    I&O's Summary      CAPILLARY BLOOD GLUCOSE      POCT Blood Glucose.: 93 mg/dL (26 Feb 2018 12:13)      PHYSICAL EXAM  General: A&Ox 3; Elderly female sitting in bed in NAD  Head: NC/AT;   Eyes: PERRL; EOMI; anicteric sclera  Neck: Supple; no JVD  Respiratory: Clear to auscultation bilaterally  Cardiovascular: Regular rhythm/rate; S1/S2; no gallops or murmurs auscultated  Gastrointestinal: Soft; NTND w/out rebound tenderness or guarding; bowel sounds normal  Extremities: WWP; no edema or cyanosis; radial/pedal pulses palpable, dry skin  Neurological:  CNII-XII grossly intact; no obvious focal deficits    MEDICATIONS  (STANDING):  ALBUTerol/ipratropium for Nebulization 3 milliLiter(s) Nebulizer every 6 hours  amLODIPine   Tablet 5 milliGRAM(s) Oral daily  azithromycin  IVPB 500 milliGRAM(s) IV Intermittent every 24 hours  cefTRIAXone   IVPB 1 Gram(s) IV Intermittent every 24 hours  heparin  Injectable 5000 Unit(s) SubCutaneous every 8 hours  hydrochlorothiazide 25 milliGRAM(s) Oral daily  losartan 50 milliGRAM(s) Oral daily    MEDICATIONS  (PRN):      LABS                        12.2   8.1   )-----------( 389      ( 26 Feb 2018 07:45 )             35.9     02-26    135  |  96  |  15  ----------------------------<  98  4.7   |  28  |  1.02    Ca    9.6      26 Feb 2018 07:45  Mg     2.1     02-26    TPro  7.1  /  Alb  3.4  /  TBili  0.4  /  DBili  x   /  AST  23  /  ALT  20  /  AlkPhos  232<H>  02-26    LIVER FUNCTIONS - ( 26 Feb 2018 07:45 )  Alb: 3.4 g/dL / Pro: 7.1 g/dL / ALK PHOS: 232 U/L / ALT: 20 U/L / AST: 23 U/L / GGT: x           PT/INR - ( 26 Feb 2018 07:45 )   PT: 11.7 sec;   INR: 1.05          PTT - ( 26 Feb 2018 07:45 )  PTT:28.2 sec O/N Events:  No acute events overnight  Subjective:  Patient seen and examined at bedside, denies chest pain and shortness of breath but does admit to minor abdominal discomfort without nausea vomiting or diarrhea.    VITALS  Vital Signs Last 24 Hrs  T(C): 37.1 (27 Feb 2018 04:41), Max: 37.1 (27 Feb 2018 04:41)  T(F): 98.7 (27 Feb 2018 04:41), Max: 98.7 (27 Feb 2018 04:41)  HR: 89 (27 Feb 2018 04:41) (84 - 89)  BP: 125/61 (27 Feb 2018 04:41) (125/61 - 130/69)  BP(mean): 84 (26 Feb 2018 16:07) (84 - 84)  RR: 18 (27 Feb 2018 04:41) (17 - 19)  SpO2: 97% (27 Feb 2018 04:41) (93% - 97%)    I&O's Summary      CAPILLARY BLOOD GLUCOSE      POCT Blood Glucose.: 93 mg/dL (26 Feb 2018 12:13)      PHYSICAL EXAM  General: A&Ox 3; Elderly female sitting in bed in NAD  Head: NC/AT;   Eyes: PERRL; EOMI; anicteric sclera  Neck: Supple; no JVD  Respiratory: Patient with bilateral wheezes this AM  Cardiovascular: Regular rhythm/rate; S1/S2; no gallops or murmurs auscultated  Gastrointestinal: Soft; NTND w/out rebound tenderness or guarding; bowel sounds normal  Extremities: WWP; no edema or cyanosis; radial/pedal pulses palpable, dry skin on bilateral lower extremities  Neurological:  CNII-XII grossly intact; no obvious focal deficits    MEDICATIONS  (STANDING):  ALBUTerol/ipratropium for Nebulization 3 milliLiter(s) Nebulizer every 6 hours  amLODIPine   Tablet 5 milliGRAM(s) Oral daily  azithromycin  IVPB 500 milliGRAM(s) IV Intermittent every 24 hours  cefTRIAXone   IVPB 1 Gram(s) IV Intermittent every 24 hours  heparin  Injectable 5000 Unit(s) SubCutaneous every 8 hours  hydrochlorothiazide 25 milliGRAM(s) Oral daily  losartan 50 milliGRAM(s) Oral daily    MEDICATIONS  (PRN):      LABS                        12.2   8.1   )-----------( 389      ( 26 Feb 2018 07:45 )             35.9     02-26    135  |  96  |  15  ----------------------------<  98  4.7   |  28  |  1.02    Ca    9.6      26 Feb 2018 07:45  Mg     2.1     02-26    TPro  7.1  /  Alb  3.4  /  TBili  0.4  /  DBili  x   /  AST  23  /  ALT  20  /  AlkPhos  232<H>  02-26    LIVER FUNCTIONS - ( 26 Feb 2018 07:45 )  Alb: 3.4 g/dL / Pro: 7.1 g/dL / ALK PHOS: 232 U/L / ALT: 20 U/L / AST: 23 U/L / GGT: x           PT/INR - ( 26 Feb 2018 07:45 )   PT: 11.7 sec;   INR: 1.05          PTT - ( 26 Feb 2018 07:45 )  PTT:28.2 sec    < from: CT Chest No Cont (02.23.18 @ 11:02) >  Impression:   1.  Since exam 10/9/2017, there are new multifocal areas of consolidation   of the right middle and right lower lobes as well as scattered subpleural   groundglass opacities in the left lung. These may be infectious in   etiology. Differential diagnosis includes pulmonary edema, hemorrhage, or   diffuse alveolar damage. As there is interlobular septal thickening, the   possibility of malignancy with lymphangitic carcinomatosis should be   excluded.  2.  There are again severe emphysematous changes.  3.  No change lung nodules.   4.  Findings loss right hemithorax could be caused by atelectasis due to   bronchial wall thickening.  5.  New small right pleural effusion.  6.  There is again mild adenopathy.    < end of copied text >

## 2018-02-27 NOTE — PROGRESS NOTE ADULT - ATTENDING COMMENTS
Pt seen and examined, VSS, exam as above, labs and imaging reviewed.    Agree with assessment as above with some changes  1. Newly dx metastatic lung ca with mets to lungs, liver, lymph nodes, bones  2. Emphysema/COPD  3. TB, s/p tx  4. Atypical PNA  - d/w pt results of PET scan as metastatic cancer and d/w pt pulm's plan to determine pathology through bronchoscopy  - she is agreeable to plan, but overwhelmed and saddened to hear these results   - agreeable to meeting oncology to establish rapport and for f/u once results of path results for treatment options  - would like to defer talking to her family about above for now and deferred extra supportive services at this time

## 2018-02-27 NOTE — PROGRESS NOTE ADULT - ATTENDING COMMENTS
She had her PET scan done yesterday. Final report is pending. Mild activity is seen in area of interest. Mediastinal LN (+). Pleural thickening right side hypermetabolic. Liver mets. Bony mets to right pelvis.  She is afebrile. VSS. No new respiratory symptoms.   I have discussed with Dr. Pickett about doing a TBBx Right lung.  Bx of iliac crest may allow highest staging to be done

## 2018-02-27 NOTE — PROGRESS NOTE ADULT - PROBLEM SELECTOR PLAN 1
-CXR and CT concerning for RML/RLL PNA.  No leukocytosis.  Patient remains afebrile, other vitals WNL.  -Ceftriaxone 1g q 24 and azithromycin 500mg q 24 per pulm recs  -F/u sputum cultures  -CT chest results above  -HIV negative  -Pulm consulted, patient for PET scan today -CXR and CT concerning for RML/RLL PNA.  No leukocytosis.  Patient remains afebrile, other vitals WNL.  -Ceftriaxone 1g q 24 and azithromycin 500mg q 24 per pulm recs  -F/u sputum cultures  -CT chest results above  -HIV negative  -PET scan performed, awaiting final results prior to pulmonary team intervention.

## 2018-02-27 NOTE — DIETITIAN INITIAL EVALUATION ADULT. - PROBLEM SELECTOR PLAN 5
Chronic.  Likely 2/2 venous insufficiency w/ concurrent norvasc use.  R leg slightly larger than L.   - b/l LE dopplers to r/o DVT

## 2018-02-27 NOTE — PROGRESS NOTE ADULT - PROBLEM SELECTOR PLAN 1
- Clinically improved. No fevers, well appearing, some cough with yellow phlegm that is easy to expectorate, not requiring supplemental oxygen.  - Continue cef/azithro for a total of 7 days (complete tomorrow)  - continue duo nebs.  - s/p PET CT which demonstrated likely metastatic lung cancer (see below for plan)

## 2018-02-27 NOTE — DIETITIAN INITIAL EVALUATION ADULT. - PROBLEM SELECTOR PLAN 1
CXR concerning for RLL PNA.  Likely postobstructive in the setting of enlarging RLL nodule seen on previous images.  WBC mildly elevated.  Pt remains afebrile, other vitals WNL.     - Start Zosyn  - f/u sputum cultures  - f/u CT chest  - f/u HIV

## 2018-02-28 ENCOUNTER — RESULT REVIEW (OUTPATIENT)
Age: 83
End: 2018-02-28

## 2018-02-28 LAB
ALBUMIN SERPL ELPH-MCNC: 3.3 G/DL — SIGNIFICANT CHANGE UP (ref 3.3–5)
ALP SERPL-CCNC: 239 U/L — HIGH (ref 40–120)
ALT FLD-CCNC: 29 U/L — SIGNIFICANT CHANGE UP (ref 10–45)
ANION GAP SERPL CALC-SCNC: 11 MMOL/L — SIGNIFICANT CHANGE UP (ref 5–17)
APTT BLD: 27.5 SEC — SIGNIFICANT CHANGE UP (ref 27.5–37.4)
AST SERPL-CCNC: 30 U/L — SIGNIFICANT CHANGE UP (ref 10–40)
B PERT IGG+IGM PNL SER: CLEAR — SIGNIFICANT CHANGE UP
BILIRUB SERPL-MCNC: 0.5 MG/DL — SIGNIFICANT CHANGE UP (ref 0.2–1.2)
BUN SERPL-MCNC: 17 MG/DL — SIGNIFICANT CHANGE UP (ref 7–23)
CALCIUM SERPL-MCNC: 9.8 MG/DL — SIGNIFICANT CHANGE UP (ref 8.4–10.5)
CHLORIDE SERPL-SCNC: 97 MMOL/L — SIGNIFICANT CHANGE UP (ref 96–108)
CO2 SERPL-SCNC: 27 MMOL/L — SIGNIFICANT CHANGE UP (ref 22–31)
COLOR FLD: SIGNIFICANT CHANGE UP
COMMENT - FLUIDS: SIGNIFICANT CHANGE UP
COMMENT - FLUIDS: SIGNIFICANT CHANGE UP
CREAT SERPL-MCNC: 1.01 MG/DL — SIGNIFICANT CHANGE UP (ref 0.5–1.3)
EOSINOPHIL # FLD: 2 % — SIGNIFICANT CHANGE UP
FLUID INTAKE SUBSTANCE CLASS: SIGNIFICANT CHANGE UP
FLUID SEGMENTED GRANULOCYTES: 9 % — SIGNIFICANT CHANGE UP
GLUCOSE SERPL-MCNC: 95 MG/DL — SIGNIFICANT CHANGE UP (ref 70–99)
GRAM STN FLD: SIGNIFICANT CHANGE UP
GRAM STN FLD: SIGNIFICANT CHANGE UP
HCT VFR BLD CALC: 35.7 % — SIGNIFICANT CHANGE UP (ref 34.5–45)
HGB BLD-MCNC: 11.9 G/DL — SIGNIFICANT CHANGE UP (ref 11.5–15.5)
INR BLD: 1.07 — SIGNIFICANT CHANGE UP (ref 0.88–1.16)
LYMPHOCYTES # FLD: 29 % — SIGNIFICANT CHANGE UP
MAGNESIUM SERPL-MCNC: 1.7 MG/DL — SIGNIFICANT CHANGE UP (ref 1.6–2.6)
MCHC RBC-ENTMCNC: 28 PG — SIGNIFICANT CHANGE UP (ref 27–34)
MCHC RBC-ENTMCNC: 33.3 G/DL — SIGNIFICANT CHANGE UP (ref 32–36)
MCV RBC AUTO: 84 FL — SIGNIFICANT CHANGE UP (ref 80–100)
MONOS+MACROS # FLD: 7 % — SIGNIFICANT CHANGE UP
OTHER CELLS FLD MANUAL: SIGNIFICANT CHANGE UP %
PLATELET # BLD AUTO: 410 K/UL — HIGH (ref 150–400)
POTASSIUM SERPL-MCNC: 4.6 MMOL/L — SIGNIFICANT CHANGE UP (ref 3.5–5.3)
POTASSIUM SERPL-SCNC: 4.6 MMOL/L — SIGNIFICANT CHANGE UP (ref 3.5–5.3)
PROT SERPL-MCNC: 6.7 G/DL — SIGNIFICANT CHANGE UP (ref 6–8.3)
PROTHROM AB SERPL-ACNC: 11.9 SEC — SIGNIFICANT CHANGE UP (ref 9.8–12.7)
RBC # BLD: 4.25 M/UL — SIGNIFICANT CHANGE UP (ref 3.8–5.2)
RBC # FLD: 14.6 % — SIGNIFICANT CHANGE UP (ref 10.3–16.9)
RCV VOL RI: 810 /UL — HIGH (ref 0–5)
SODIUM SERPL-SCNC: 135 MMOL/L — SIGNIFICANT CHANGE UP (ref 135–145)
SPECIMEN SOURCE FLD: SIGNIFICANT CHANGE UP
SPECIMEN SOURCE: SIGNIFICANT CHANGE UP
SPECIMEN SOURCE: SIGNIFICANT CHANGE UP
TOTAL NUCLEATED CELL COUNT, BODY FLUID: 48 /UL — HIGH (ref 0–5)
TUBE TYPE: SIGNIFICANT CHANGE UP
WBC # BLD: 7.8 K/UL — SIGNIFICANT CHANGE UP (ref 3.8–10.5)
WBC # FLD AUTO: 7.8 K/UL — SIGNIFICANT CHANGE UP (ref 3.8–10.5)

## 2018-02-28 PROCEDURE — 31624 DX BRONCHOSCOPE/LAVAGE: CPT

## 2018-02-28 PROCEDURE — 99233 SBSQ HOSP IP/OBS HIGH 50: CPT | Mod: GC

## 2018-02-28 PROCEDURE — 70553 MRI BRAIN STEM W/O & W/DYE: CPT | Mod: 26

## 2018-02-28 PROCEDURE — 99232 SBSQ HOSP IP/OBS MODERATE 35: CPT

## 2018-02-28 PROCEDURE — 71045 X-RAY EXAM CHEST 1 VIEW: CPT | Mod: 26

## 2018-02-28 PROCEDURE — 31652 BRONCH EBUS SAMPLNG 1/2 NODE: CPT

## 2018-02-28 RX ORDER — MAGNESIUM SULFATE 500 MG/ML
1 VIAL (ML) INJECTION ONCE
Qty: 0 | Refills: 0 | Status: COMPLETED | OUTPATIENT
Start: 2018-02-28 | End: 2018-02-28

## 2018-02-28 RX ADMIN — AMLODIPINE BESYLATE 5 MILLIGRAM(S): 2.5 TABLET ORAL at 06:21

## 2018-02-28 RX ADMIN — HEPARIN SODIUM 5000 UNIT(S): 5000 INJECTION INTRAVENOUS; SUBCUTANEOUS at 06:21

## 2018-02-28 RX ADMIN — Medication 3 MILLILITER(S): at 18:46

## 2018-02-28 RX ADMIN — HEPARIN SODIUM 5000 UNIT(S): 5000 INJECTION INTRAVENOUS; SUBCUTANEOUS at 13:39

## 2018-02-28 RX ADMIN — AZITHROMYCIN 255 MILLIGRAM(S): 500 TABLET, FILM COATED ORAL at 18:47

## 2018-02-28 RX ADMIN — LOSARTAN POTASSIUM 50 MILLIGRAM(S): 100 TABLET, FILM COATED ORAL at 06:21

## 2018-02-28 RX ADMIN — Medication 100 GRAM(S): at 12:08

## 2018-02-28 RX ADMIN — Medication 3 MILLILITER(S): at 12:08

## 2018-02-28 RX ADMIN — CEFTRIAXONE 100 GRAM(S): 500 INJECTION, POWDER, FOR SOLUTION INTRAMUSCULAR; INTRAVENOUS at 18:46

## 2018-02-28 RX ADMIN — Medication 3 MILLILITER(S): at 06:21

## 2018-02-28 NOTE — PROGRESS NOTE ADULT - SUBJECTIVE AND OBJECTIVE BOX
Interval Events:  Patient seen and examined at bedside. Saw pt on the way to bronchoscopy - she feels well, has not eaten this morning in preparation for the procedure. Cough continues to be better and denies SOB. Is in pleasant spirits despite the recent findings.     MEDICATIONS:  Pulmonary:  ALBUTerol/ipratropium for Nebulization 3 milliLiter(s) Nebulizer every 6 hours    Antimicrobials:  azithromycin  IVPB 500 milliGRAM(s) IV Intermittent every 24 hours  cefTRIAXone   IVPB 1 Gram(s) IV Intermittent every 24 hours    Anticoagulants:  heparin  Injectable 5000 Unit(s) SubCutaneous every 8 hours    Cardiac:  amLODIPine   Tablet 5 milliGRAM(s) Oral daily  losartan 50 milliGRAM(s) Oral daily    Endocrine:    Allergies    aspirin (Unknown)    Intolerances        Vital Signs Last 24 Hrs  T(C): 37.2 (28 Feb 2018 05:30), Max: 37.2 (28 Feb 2018 05:30)  T(F): 98.9 (28 Feb 2018 05:30), Max: 98.9 (28 Feb 2018 05:30)  HR: 92 (28 Feb 2018 05:30) (86 - 92)  BP: 136/69 (28 Feb 2018 05:30) (103/56 - 145/76)  BP(mean): --  RR: 17 (28 Feb 2018 05:30) (17 - 18)  SpO2: 96% (28 Feb 2018 05:30) (96% - 98%)    02-27 @ 07:01  -  02-28 @ 07:00  --------------------------------------------------------  IN: 250 mL / OUT: 0 mL / NET: 250 mL          LABS:      CBC Full  -  ( 28 Feb 2018 07:11 )  WBC Count : 7.8 K/uL  Hemoglobin : 11.9 g/dL  Hematocrit : 35.7 %  Platelet Count - Automated : 410 K/uL  Mean Cell Volume : 84.0 fL  Mean Cell Hemoglobin : 28.0 pg  Mean Cell Hemoglobin Concentration : 33.3 g/dL      02-28    135  |  97  |  17  ----------------------------<  95  4.6   |  27  |  1.01    Ca    9.8      28 Feb 2018 07:11  Mg     1.7     02-28    TPro  6.7  /  Alb  3.3  /  TBili  0.5  /  DBili  x   /  AST  30  /  ALT  29  /  AlkPhos  239<H>  02-28    PT/INR - ( 28 Feb 2018 07:11 )   PT: 11.9 sec;   INR: 1.07          PTT - ( 28 Feb 2018 07:11 )  PTT:27.5 sec                  RADIOLOGY & ADDITIONAL STUDIES (The following images were personally reviewed):

## 2018-02-28 NOTE — PROGRESS NOTE ADULT - PROBLEM SELECTOR PLAN 3
-Multiple lung nodules on previous CT scan.  Patient reports history of unintentional weight loss over past month, unable to quantify.   -PET scan performed, awaiting final results prior to pulmonary team intervention. -Multiple lung nodules on previous CT scan.  Patient reports history of unintentional weight loss over past month, unable to quantify.   -PET scan shows likely metastatic lung CA, patient -Multiple lung nodules on previous CT scan.  Patient reports history of unintentional weight loss over past month, unable to quantify.   -PET scan shows likely metastatic lung CA, patient for bronchosocpy with BAL and linear EBUS for LN biopsy

## 2018-02-28 NOTE — PROGRESS NOTE ADULT - PROBLEM SELECTOR PLAN 1
-CXR and CT concerning for RML/RLL PNA.  No leukocytosis.  Patient remains afebrile, other vitals WNL.  -Ceftriaxone 1g q 24 and azithromycin 500mg q 24 per pulm recs  -F/u sputum cultures  -CT chest results above  -HIV negative  -PET scan performed, awaiting final results prior to pulmonary team intervention. -CXR and CT concerning for RML/RLL PNA.  No leukocytosis.  Patient remains afebrile, other vitals WNL.  -Ceftriaxone 1g q 24 and azithromycin 500mg q 24 per pulm recs, finish today  -HIV negative  -PET scan performed, shows likely metastatic lung CA, patient for bronchosocpy with BAL and linear EBUS for LN biopsy  -Heme/onc consulted, will appreciate recs

## 2018-02-28 NOTE — PROGRESS NOTE ADULT - PROBLEM SELECTOR PLAN 2
-Patient saturating 98% on RA at rest, desaturating to 86% while ambulating, including with PT on 2/26/18.  Denies previous cardiac hx.  BNP slightly elevated to 634, however, previous BNP unavailable for reference.  Etiology likely 2/2 PNA in setting of severe emphysema.  -CT chest results as above  -Echocardiogram shows EF 60-65% and no diastolic dysfunction  -Duoneb q 6  -PET scan performed, awaiting final results prior to pulmonary team intervention. -Patient saturating 98% on RA at rest, desaturating to 86% while ambulating, including with PT on 2/26/18.  Denies previous cardiac hx.  BNP slightly elevated to 634, however, previous BNP unavailable for reference.  Etiology likely 2/2 PNA in setting of severe emphysema.  -Echocardiogram shows EF 60-65% and no diastolic dysfunction  -Duoneb q 6  -PET scan performed, shows likely metastatic lung CA, patient for bronchosocpy with BAL and linear EBUS for LN biopsy

## 2018-02-28 NOTE — PROGRESS NOTE ADULT - PROBLEM SELECTOR PLAN 2
- Evidence of disease within lung parenchyma as well as liver and bone  - F/U heme/onc consult and consider biopsy of the lesion furthest from the primary (bone) vs liver  -This morning will undergo bronchoscopy with BAL and linear EBUS for LN biopsy - Evidence of disease within lung parenchyma as well as liver and bone  - F/U heme/onc consult and consider biopsy of the lesion furthest from the primary (bone) vs liver  -This morning will undergo bronchoscopy with BAL and linear EBUS for LN biopsy*Addendum* tolerated procedure well, can likely be discharged with out pt F/U. Can F/U with Dr. Thornton.

## 2018-02-28 NOTE — PROGRESS NOTE ADULT - ATTENDING COMMENTS
The patient was taken for bronch this AM. A preliminary path report was obtained. No complications noted.   She is oriented. No hemoptysis noted. SpO2 is adequate on low flow (96-97%)  Adequate air entry.  Suspect malignancy with mets to liver and pelvis. Path from today's EBUS awaited.  Suggest walk test on RA to see if patient requires home O2 on discharge

## 2018-02-28 NOTE — CONSULT NOTE ADULT - ASSESSMENT
83 year old F with extensive smoking hx (1/2 ppd x 75 years), COPD and hx of pulmonary nodules (follows with Dr. Thornton) who presented with dyspnea and cough and found to have increase in previously known pulm nodules as well as new nodules. PET/CT showed extensive disease in liver, spine, and thoracic lymph nodes. She is s/p bronchoscopy with biopsy.     #Lung nodules  Pt has extensive metastatic disease with lung cancer likely primary tumor.   -Will f/u pathology from biopsy from bronchoscopy  -Recommend holding off on obtaining bone or liver biopsy for now as lung mass biopsy should be definitive if sample is sufficient  -Recommend Brain MRI with contrast to complete staging workup  -If pathology is NSCLC, will ask pathologist to evaluate for PDL-1 status as well as mutational analysis including EGFR, ALK, ROS1, and BRAF  -Patient will f/u with Dr. Fariba Stroud/Dr. Nhi Jackson at [facility]; Office will call her with appt date and time (She is already an established patient at [facility])      Case discussed w/ Dr. Jackson

## 2018-02-28 NOTE — PROGRESS NOTE ADULT - PROBLEM SELECTOR PLAN 1
- Clinically improved. No fevers, well appearing, some cough with yellow phlegm that is easy to expectorate, not requiring supplemental oxygen.  - Continue cef/azithro for a total of 7 days (complete today)  - continue duo nebs.  - s/p PET CT which demonstrated likely metastatic lung cancer (see below for plan)

## 2018-02-28 NOTE — PROGRESS NOTE ADULT - PROBLEM SELECTOR PLAN 4
-With elevated GGT.  No RUQ tenderness.  Will follow up PET scan results.  -Continue to monitor with CMP -With elevated GGT.  No RUQ tenderness.  Possibly 2/2 hepatic metastases found on PET scan.

## 2018-02-28 NOTE — PROGRESS NOTE ADULT - SUBJECTIVE AND OBJECTIVE BOX
O/N Events:  No acute events overnight  Subjective:  Patient seen and examined at bedside.  Denies chest pain, shortness of breath, abdominal pain, nausea, vomiting or diarrhea.    VITALS  Vital Signs Last 24 Hrs  T(C): 37.2 (28 Feb 2018 05:30), Max: 37.2 (28 Feb 2018 05:30)  T(F): 98.9 (28 Feb 2018 05:30), Max: 98.9 (28 Feb 2018 05:30)  HR: 92 (28 Feb 2018 05:30) (86 - 92)  BP: 136/69 (28 Feb 2018 05:30) (103/56 - 145/76)  BP(mean): --  RR: 17 (28 Feb 2018 05:30) (17 - 18)  SpO2: 96% (28 Feb 2018 05:30) (96% - 98%)    I&O's Summary    27 Feb 2018 07:01  -  28 Feb 2018 06:22  --------------------------------------------------------  IN: 250 mL / OUT: 0 mL / NET: 250 mL        CAPILLARY BLOOD GLUCOSE          PHYSICAL EXAM  General: A&Ox 3; Elderly female sitting in bed in NAD  Head: NC/AT;   Eyes: PERRL; EOMI; anicteric sclera  Neck: Supple; no JVD  Respiratory: Patient with bilateral wheezes this AM  Cardiovascular: Regular rhythm/rate; S1/S2; no gallops or murmurs auscultated  Gastrointestinal: Soft; NTND w/out rebound tenderness or guarding; bowel sounds normal  Extremities: WWP; no edema or cyanosis; radial/pedal pulses palpable, dry skin on bilateral lower extremities  Neurological:  CNII-XII grossly intact; no obvious focal deficits    MEDICATIONS  (STANDING):  ALBUTerol/ipratropium for Nebulization 3 milliLiter(s) Nebulizer every 6 hours  amLODIPine   Tablet 5 milliGRAM(s) Oral daily  azithromycin  IVPB 500 milliGRAM(s) IV Intermittent every 24 hours  cefTRIAXone   IVPB 1 Gram(s) IV Intermittent every 24 hours  heparin  Injectable 5000 Unit(s) SubCutaneous every 8 hours  losartan 50 milliGRAM(s) Oral daily    MEDICATIONS  (PRN):      LABS                        11.6   7.8   )-----------( 361      ( 27 Feb 2018 07:05 )             34.4     02-27    131<L>  |  94<L>  |  18  ----------------------------<  104<H>  4.2   |  25  |  1.09    Ca    9.2      27 Feb 2018 07:05  Mg     1.9     02-27    TPro  6.9  /  Alb  3.3  /  TBili  0.5  /  DBili  x   /  AST  36  /  ALT  27  /  AlkPhos  236<H>  02-27    LIVER FUNCTIONS - ( 27 Feb 2018 07:05 )  Alb: 3.3 g/dL / Pro: 6.9 g/dL / ALK PHOS: 236 U/L / ALT: 27 U/L / AST: 36 U/L / GGT: x           PT/INR - ( 26 Feb 2018 07:45 )   PT: 11.7 sec;   INR: 1.05          PTT - ( 26 Feb 2018 07:45 )  PTT:28.2 sec O/N Events:  No acute events overnight.  Subjective:  Patient seen and examined at bedside.  Denies chest pain, shortness of breath, cough but does admit to minor abdominal discomfort.    VITALS  Vital Signs Last 24 Hrs  T(C): 37.2 (28 Feb 2018 05:30), Max: 37.2 (28 Feb 2018 05:30)  T(F): 98.9 (28 Feb 2018 05:30), Max: 98.9 (28 Feb 2018 05:30)  HR: 92 (28 Feb 2018 05:30) (86 - 92)  BP: 136/69 (28 Feb 2018 05:30) (103/56 - 145/76)  BP(mean): --  RR: 17 (28 Feb 2018 05:30) (17 - 18)  SpO2: 96% (28 Feb 2018 05:30) (96% - 98%)    I&O's Summary    27 Feb 2018 07:01  -  28 Feb 2018 06:22  --------------------------------------------------------  IN: 250 mL / OUT: 0 mL / NET: 250 mL        CAPILLARY BLOOD GLUCOSE          PHYSICAL EXAM  General: A&Ox 3; Elderly female sitting in bed in NAD  Head: NC/AT;   Eyes: PERRL; EOMI; anicteric sclera  Neck: Supple; no JVD  Respiratory: Clear to auscultation bilaterally  Cardiovascular: Regular rhythm/rate; S1/S2; no gallops or murmurs auscultated  Gastrointestinal: Soft; NTND w/out rebound tenderness or guarding; bowel sounds normal  Extremities: WWP; no edema or cyanosis; radial/pedal pulses palpable, dry skin on bilateral lower extremities  Neurological:  CNII-XII grossly intact; no obvious focal deficits    MEDICATIONS  (STANDING):  ALBUTerol/ipratropium for Nebulization 3 milliLiter(s) Nebulizer every 6 hours  amLODIPine   Tablet 5 milliGRAM(s) Oral daily  azithromycin  IVPB 500 milliGRAM(s) IV Intermittent every 24 hours  cefTRIAXone   IVPB 1 Gram(s) IV Intermittent every 24 hours  heparin  Injectable 5000 Unit(s) SubCutaneous every 8 hours  losartan 50 milliGRAM(s) Oral daily    MEDICATIONS  (PRN):      LABS                        11.6   7.8   )-----------( 361      ( 27 Feb 2018 07:05 )             34.4     02-27    131<L>  |  94<L>  |  18  ----------------------------<  104<H>  4.2   |  25  |  1.09    Ca    9.2      27 Feb 2018 07:05  Mg     1.9     02-27    TPro  6.9  /  Alb  3.3  /  TBili  0.5  /  DBili  x   /  AST  36  /  ALT  27  /  AlkPhos  236<H>  02-27    LIVER FUNCTIONS - ( 27 Feb 2018 07:05 )  Alb: 3.3 g/dL / Pro: 6.9 g/dL / ALK PHOS: 236 U/L / ALT: 27 U/L / AST: 36 U/L / GGT: x           PT/INR - ( 26 Feb 2018 07:45 )   PT: 11.7 sec;   INR: 1.05          PTT - ( 26 Feb 2018 07:45 )  PTT:28.2 sec    < from: NM PET/CT Onc FDG Skull to Thigh, Inital (02.26.18 @ 15:28) >  Impression: 1. Increased activity within consolidated right middle lobe   and within bilateral lung nodules, suspicious for lung cancer.    2. Increased activity within rind of pleural thickening right hemithorax,   consistent with metastatic disease.    3. Hypermetabolic left supraclavicular, thoracic and abdominal lymph   nodes, consistent with metastatic disease.    4. Hypermetabolic liver lesions, consistent with hepatic metastatic   disease.    5. Hypermetabolic osseous lesions, consistent with metastatic disease.    6. Interlobular septal thickening right lung, consistent with   lymphangitic carcinomatosis.    7. Hypermetabolic, peripherally located areas of groundglass opacity with   "reverse halo" appearances in left lung. These could represent additional   sites of malignancy versus organizing pneumonia.    < end of copied text >

## 2018-02-28 NOTE — PROGRESS NOTE ADULT - ATTENDING COMMENTS
Pt seen and examined by me at bedside s/p bronch. pt feels comfortable with no complaints. Agree with rest of housestaff's exam/a/p as noted above with additions VSS on NC; decrease Pt seen and examined by me at bedside s/p bronch. pt feels comfortable with no complaints. Agree with rest of housestaff's exam/a/p as noted above with additions VSS on NC; decrease bs on R lower lung fields, CXR 2/28 reviewed. Appreciate pulm recs. will need home O2 setup.   Likely stage IV lung ca, pending bronch result. will need outpatient pulm/onc f/u.

## 2018-03-01 DIAGNOSIS — M10.9 GOUT, UNSPECIFIED: ICD-10-CM

## 2018-03-01 LAB
ALBUMIN SERPL ELPH-MCNC: 3.5 G/DL — SIGNIFICANT CHANGE UP (ref 3.3–5)
ALP SERPL-CCNC: 264 U/L — HIGH (ref 40–120)
ALT FLD-CCNC: 28 U/L — SIGNIFICANT CHANGE UP (ref 10–45)
ANION GAP SERPL CALC-SCNC: 14 MMOL/L — SIGNIFICANT CHANGE UP (ref 5–17)
AST SERPL-CCNC: 28 U/L — SIGNIFICANT CHANGE UP (ref 10–40)
BILIRUB SERPL-MCNC: 0.6 MG/DL — SIGNIFICANT CHANGE UP (ref 0.2–1.2)
BUN SERPL-MCNC: 17 MG/DL — SIGNIFICANT CHANGE UP (ref 7–23)
CALCIUM SERPL-MCNC: 9.7 MG/DL — SIGNIFICANT CHANGE UP (ref 8.4–10.5)
CHLORIDE SERPL-SCNC: 94 MMOL/L — LOW (ref 96–108)
CO2 SERPL-SCNC: 25 MMOL/L — SIGNIFICANT CHANGE UP (ref 22–31)
CREAT SERPL-MCNC: 0.96 MG/DL — SIGNIFICANT CHANGE UP (ref 0.5–1.3)
GLUCOSE SERPL-MCNC: 95 MG/DL — SIGNIFICANT CHANGE UP (ref 70–99)
HCT VFR BLD CALC: 36 % — SIGNIFICANT CHANGE UP (ref 34.5–45)
HGB BLD-MCNC: 11.7 G/DL — SIGNIFICANT CHANGE UP (ref 11.5–15.5)
MAGNESIUM SERPL-MCNC: 1.9 MG/DL — SIGNIFICANT CHANGE UP (ref 1.6–2.6)
MCHC RBC-ENTMCNC: 28.3 PG — SIGNIFICANT CHANGE UP (ref 27–34)
MCHC RBC-ENTMCNC: 32.5 G/DL — SIGNIFICANT CHANGE UP (ref 32–36)
MCV RBC AUTO: 87.2 FL — SIGNIFICANT CHANGE UP (ref 80–100)
NIGHT BLUE STAIN TISS: SIGNIFICANT CHANGE UP
NIGHT BLUE STAIN TISS: SIGNIFICANT CHANGE UP
PLATELET # BLD AUTO: 403 K/UL — HIGH (ref 150–400)
POTASSIUM SERPL-MCNC: 4.3 MMOL/L — SIGNIFICANT CHANGE UP (ref 3.5–5.3)
POTASSIUM SERPL-SCNC: 4.3 MMOL/L — SIGNIFICANT CHANGE UP (ref 3.5–5.3)
PROT SERPL-MCNC: 7 G/DL — SIGNIFICANT CHANGE UP (ref 6–8.3)
RBC # BLD: 4.13 M/UL — SIGNIFICANT CHANGE UP (ref 3.8–5.2)
RBC # FLD: 14.9 % — SIGNIFICANT CHANGE UP (ref 10.3–16.9)
SODIUM SERPL-SCNC: 133 MMOL/L — LOW (ref 135–145)
SPECIMEN SOURCE: SIGNIFICANT CHANGE UP
SPECIMEN SOURCE: SIGNIFICANT CHANGE UP
WBC # BLD: 9.5 K/UL — SIGNIFICANT CHANGE UP (ref 3.8–10.5)
WBC # FLD AUTO: 9.5 K/UL — SIGNIFICANT CHANGE UP (ref 3.8–10.5)

## 2018-03-01 PROCEDURE — 99232 SBSQ HOSP IP/OBS MODERATE 35: CPT

## 2018-03-01 PROCEDURE — 73610 X-RAY EXAM OF ANKLE: CPT | Mod: 26,LT

## 2018-03-01 PROCEDURE — 99233 SBSQ HOSP IP/OBS HIGH 50: CPT

## 2018-03-01 RX ORDER — COLCHICINE 0.6 MG
0.6 TABLET ORAL DAILY
Qty: 0 | Refills: 0 | Status: DISCONTINUED | OUTPATIENT
Start: 2018-03-01 | End: 2018-03-02

## 2018-03-01 RX ORDER — ACETAMINOPHEN 500 MG
650 TABLET ORAL EVERY 6 HOURS
Qty: 0 | Refills: 0 | Status: DISCONTINUED | OUTPATIENT
Start: 2018-03-01 | End: 2018-03-02

## 2018-03-01 RX ORDER — COLCHICINE 0.6 MG
1.2 TABLET ORAL ONCE
Qty: 0 | Refills: 0 | Status: COMPLETED | OUTPATIENT
Start: 2018-03-01 | End: 2018-03-01

## 2018-03-01 RX ADMIN — Medication 1.2 MILLIGRAM(S): at 11:50

## 2018-03-01 RX ADMIN — Medication 650 MILLIGRAM(S): at 06:34

## 2018-03-01 RX ADMIN — Medication 3 MILLILITER(S): at 05:33

## 2018-03-01 RX ADMIN — Medication 3 MILLILITER(S): at 11:50

## 2018-03-01 RX ADMIN — Medication 650 MILLIGRAM(S): at 06:39

## 2018-03-01 RX ADMIN — LOSARTAN POTASSIUM 50 MILLIGRAM(S): 100 TABLET, FILM COATED ORAL at 05:33

## 2018-03-01 RX ADMIN — Medication 0.6 MILLIGRAM(S): at 13:21

## 2018-03-01 RX ADMIN — Medication 3 MILLILITER(S): at 17:51

## 2018-03-01 RX ADMIN — Medication 650 MILLIGRAM(S): at 13:20

## 2018-03-01 RX ADMIN — HEPARIN SODIUM 5000 UNIT(S): 5000 INJECTION INTRAVENOUS; SUBCUTANEOUS at 13:22

## 2018-03-01 RX ADMIN — Medication 650 MILLIGRAM(S): at 11:43

## 2018-03-01 RX ADMIN — HEPARIN SODIUM 5000 UNIT(S): 5000 INJECTION INTRAVENOUS; SUBCUTANEOUS at 05:34

## 2018-03-01 RX ADMIN — AMLODIPINE BESYLATE 5 MILLIGRAM(S): 2.5 TABLET ORAL at 05:33

## 2018-03-01 RX ADMIN — HEPARIN SODIUM 5000 UNIT(S): 5000 INJECTION INTRAVENOUS; SUBCUTANEOUS at 22:59

## 2018-03-01 RX ADMIN — Medication 3 MILLILITER(S): at 00:19

## 2018-03-01 NOTE — PROGRESS NOTE ADULT - PROBLEM SELECTOR PROBLEM 9
Need for prophylactic measure
Nutrition, metabolism, and development symptoms
Need for prophylactic measure

## 2018-03-01 NOTE — PROGRESS NOTE ADULT - PROVIDER SPECIALTY LIST ADULT
Hospitalist
Internal Medicine
Pulmonology
Internal Medicine

## 2018-03-01 NOTE — PROGRESS NOTE ADULT - PROBLEM SELECTOR PLAN 2
-CXR and CT concerning for RML/RLL PNA.  No leukocytosis.  Patient remains afebrile, other vitals WNL.  -Ceftriaxone 1g q 24 and azithromycin 500mg q 24 finished yesterday  -HIV negative  -PET scan shows likely metastatic lung CA, patient had bronchosocpy with BAL and linear EBUS for LN biopsy, will follow up results, MRI brain with no metastases  -Heme/onc consulted, obtained MRI brain, will follow up further recs

## 2018-03-01 NOTE — PROGRESS NOTE ADULT - PROBLEM SELECTOR PLAN 4
-Patient with new acute onset of left lateral malleolus pain and swelling.  -Follow up left ankle x-ray.  -Start colchicine 1.2mg now and 0.6mg one hour later and 0.6mg daily.

## 2018-03-01 NOTE — PROGRESS NOTE ADULT - PROBLEM SELECTOR PLAN 3
-Multiple lung nodules on previous CT scan.  Patient reports history of unintentional weight loss over past month, unable to quantify.   -PET scan shows likely metastatic lung CA, patient had bronchosocpy with BAL and linear EBUS for LN biopsy, will follow up results, MRI brain with no metastases  -Heme/onc consulted, obtained MRI brain, will follow up further recs

## 2018-03-01 NOTE — PROGRESS NOTE ADULT - PROBLEM SELECTOR PLAN 5
-With elevated GGT.  No RUQ tenderness.  Possibly 2/2 hepatic metastases found on PET scan.
-Chronic, likely 2/2 venous insufficiency w/ concurrent norvasc use.  -Thrombus in L intramuscular calf vein of indeterminate age.  No treatment at this time.
-Chronic, likely 2/2 venous insufficiency w/ concurrent norvasc use.  -Thrombus in L intramuscular calf vein of indeterminate age.  No treatment at this time.
-Chronic, likely 2/2 venous insufficiency w/ concurrent norvasc use.  R leg slightly larger than L.   -Thrombus in L intramuscular calf vein of indeterminate age.  No treatment at this time.
-Chronic, likely 2/2 venous insufficiency w/ concurrent norvasc use.  R leg slightly larger than L.   -Thrombus in L intramuscular calf vein of indeterminate age.  No treatment at this time.
Unchanged, monitor exam
-Chronic, likely 2/2 venous insufficiency w/ concurrent norvasc use.  -Thrombus in L intramuscular calf vein of indeterminate age.  No treatment at this time.

## 2018-03-01 NOTE — PROGRESS NOTE ADULT - SUBJECTIVE AND OBJECTIVE BOX
Interval Events:  Patient seen and examined at bedside. Tolerated bronchoscopy well, but was noted to desat to 84% with ambulation while working with PT. Says her breathing feels well, but she has severe left ankle pain that began suddenly yesterday, ankle feels exquisitely tender to touch, warm, and swollen. Denies trauma/fall, has no hx of gout.         MEDICATIONS:  Pulmonary:  ALBUTerol/ipratropium for Nebulization 3 milliLiter(s) Nebulizer every 6 hours    Antimicrobials:  azithromycin  IVPB 500 milliGRAM(s) IV Intermittent every 24 hours  cefTRIAXone   IVPB 1 Gram(s) IV Intermittent every 24 hours    Anticoagulants:  heparin  Injectable 5000 Unit(s) SubCutaneous every 8 hours    Cardiac:  amLODIPine   Tablet 5 milliGRAM(s) Oral daily  losartan 50 milliGRAM(s) Oral daily      Allergies    aspirin (Unknown)    Intolerances        Vital Signs Last 24 Hrs  T(C): 36.6 (01 Mar 2018 09:39), Max: 37.3 (28 Feb 2018 21:39)  T(F): 97.8 (01 Mar 2018 09:39), Max: 99.1 (28 Feb 2018 21:39)  HR: 96 (01 Mar 2018 09:39) (84 - 96)  BP: 107/62 (01 Mar 2018 09:39) (107/62 - 125/56)  BP(mean): --  RR: 17 (01 Mar 2018 09:39) (15 - 17)  SpO2: 97% (01 Mar 2018 09:39) (97% - 105%)        PHYSICAL EXAM:    General: Well developed; well nourished; in no acute distress  Eyes: PERRL, EOM intact; conjunctiva and sclera clear  ENMT: No nasal discharge; airway clear  Neck: Supple; non tender; no masses  Respiratory: Decreased BS at right base  Cardiovascular: Regular rate and rhythm. S1 and S2 Normal; No murmurs, gallops or rubs  Gastrointestinal: Soft non-tender non-distended; Normal bowel sounds  Extremities: swelling, tenderness, and warmth noted at left lateral malleolus   Neurological: Alert and oriented x3  Skin: Warm and dry. No obvious rash    LABS:      CBC Full  -  ( 01 Mar 2018 07:16 )  WBC Count : 9.5 K/uL  Hemoglobin : 11.7 g/dL  Hematocrit : 36.0 %  Platelet Count - Automated : 403 K/uL  Mean Cell Volume : 87.2 fL  Mean Cell Hemoglobin : 28.3 pg  Mean Cell Hemoglobin Concentration : 32.5 g/dL    03-01    133<L>  |  94<L>  |  17  ----------------------------<  95  4.3   |  25  |  0.96    Ca    9.7      01 Mar 2018 07:16  Mg     1.9     03-01    TPro  7.0  /  Alb  3.5  /  TBili  0.6  /  DBili  x   /  AST  28  /  ALT  28  /  AlkPhos  264<H>  03-01    PT/INR - ( 28 Feb 2018 07:11 )   PT: 11.9 sec;   INR: 1.07          PTT - ( 28 Feb 2018 07:11 )  PTT:27.5 sec                  RADIOLOGY & ADDITIONAL STUDIES (The following images were personally reviewed):  < from: Xray Chest 1 View- PORTABLE-Urgent (02.28.18 @ 15:04) >  Bedside examination of the chest dated 2/28/2018 3:04 PM is compared to   the previous study of 2/22/2018.    Findings: Worsening of right basilar infiltrate/atelectasis. There is   again small right pleural effusion. I am loss right hemithorax. Calcified   mediastinal lymph node.    Impression: Worsening of bibasilar infiltrates/atelectasis.    < end of copied text >

## 2018-03-01 NOTE — PROGRESS NOTE ADULT - PROBLEM SELECTOR PLAN 7
-C/w home norvasc 5 mg and losartan 50 mg.
-Reports previous history of TB at age 3 with treatment, continue to monitor.

## 2018-03-01 NOTE — PROGRESS NOTE ADULT - SUBJECTIVE AND OBJECTIVE BOX
O/N Events:  Patient went for MRI brain overnight to rule out metastases and no metastases were found.  Also complained of left ankle pain, tylenol was given and an xray of the ankle was ordered.  Subjective:  Patient seen and examined at bedside.  Denies any chest pain, shortness of breath but endorses severe left ankle pain and tenderness.    VITALS  Vital Signs Last 24 Hrs  T(C): 36.6 (01 Mar 2018 09:39), Max: 37.3 (28 Feb 2018 21:39)  T(F): 97.8 (01 Mar 2018 09:39), Max: 99.1 (28 Feb 2018 21:39)  HR: 96 (01 Mar 2018 09:39) (84 - 96)  BP: 107/62 (01 Mar 2018 09:39) (107/62 - 125/56)  BP(mean): --  RR: 17 (01 Mar 2018 09:39) (15 - 17)  SpO2: 97% (01 Mar 2018 09:39) (97% - 105%)    I&O's Summary      CAPILLARY BLOOD GLUCOSE          PHYSICAL EXAM  General: A&Ox 3; Elderly female sitting in bed in NAD  Head: NC/AT;   Eyes: PERRL; EOMI; anicteric sclera  Neck: Supple; no JVD  Respiratory: Minimal rhonchi bilaterally  Cardiovascular: Regular rhythm/rate; S1/S2; no gallops or murmurs auscultated  Gastrointestinal: Soft; NTND w/out rebound tenderness or guarding; bowel sounds normal  Extremities: WWP; radial/pedal pulses palpable, dry skin on bilateral lower extremities, left lateral malleolus tenderness and edema  Neurological:  CNII-XII grossly intact; no obvious focal deficits    MEDICATIONS  (STANDING):  ALBUTerol/ipratropium for Nebulization 3 milliLiter(s) Nebulizer every 6 hours  amLODIPine   Tablet 5 milliGRAM(s) Oral daily  colchicine 1.2 milliGRAM(s) Oral once  colchicine 0.6 milliGRAM(s) Oral daily  heparin  Injectable 5000 Unit(s) SubCutaneous every 8 hours  losartan 50 milliGRAM(s) Oral daily    MEDICATIONS  (PRN):  acetaminophen   Tablet. 650 milliGRAM(s) Oral every 6 hours PRN Moderate Pain (4 - 6)      LABS                        11.7   9.5   )-----------( 403      ( 01 Mar 2018 07:16 )             36.0     03-01    133<L>  |  94<L>  |  17  ----------------------------<  95  4.3   |  25  |  0.96    Ca    9.7      01 Mar 2018 07:16  Mg     1.9     03-01    TPro  7.0  /  Alb  3.5  /  TBili  0.6  /  DBili  x   /  AST  28  /  ALT  28  /  AlkPhos  264<H>  03-01    LIVER FUNCTIONS - ( 01 Mar 2018 07:16 )  Alb: 3.5 g/dL / Pro: 7.0 g/dL / ALK PHOS: 264 U/L / ALT: 28 U/L / AST: 28 U/L / GGT: x           PT/INR - ( 28 Feb 2018 07:11 )   PT: 11.9 sec;   INR: 1.07          PTT - ( 28 Feb 2018 07:11 )  PTT:27.5 sec          IMAGING/EKG/ETC  EKG:   Xray:  CT:

## 2018-03-01 NOTE — PROGRESS NOTE ADULT - ASSESSMENT
83 year old female with approximately 35 pack year smoking history PMH of pulmonary nodules, emphysema, previous hx of TB, HTN who presents with worsening dyspnea on exertion for two weeks, increased sputum production and cough with imaging concerning for PNA now found to have likely metastatic lung CA.

## 2018-03-01 NOTE — PROGRESS NOTE ADULT - PROBLEM SELECTOR PLAN 6
-Chronic, likely 2/2 venous insufficiency w/ concurrent norvasc use.  -Thrombus in L intramuscular calf vein of indeterminate age.  No treatment at this time.
-C/w home norvasc 5 mg, losartan 50 mg and HCTZ 12.5 daily

## 2018-03-01 NOTE — PROGRESS NOTE ADULT - ATTENDING COMMENTS
Pt seen and examined, VSS, exam as above with decreased BS in RLL and some crackles throughout, L ankle acutely swollen and tender and warm, labs and imaging reviewed.    Agree with assessment as above with some changes  1. Newly dx metastatic lung ca with mets to lungs, liver, lymph nodes, bones  2. Emphysema/COPD  3. TB, s/p tx  4. Atypical PNA  5. Acute gout attack  - outpt f/u of pathology with pulm and H/O and to discuss tx options at that time  - pt will go home with home O2 via NC  - acute gout flare this AM- colchicine 1.2 g given and 0.6 mg to be given with 1 hr, will f/u response.  If improves will keep on short course and f/u with PMD outpt  - will be able to go home once can bear weight and walk comfortably later today vs tomorrow

## 2018-03-01 NOTE — PROGRESS NOTE ADULT - PROBLEM SELECTOR PLAN 4
- consider gouty attack of left swollen, tender, warm ankle; to get ankle XR. Management per primary team

## 2018-03-01 NOTE — PROGRESS NOTE ADULT - PROBLEM SELECTOR PROBLEM 3
Lung nodule seen on imaging study
Metastatic cancer
Lung nodule seen on imaging study
Metastatic cancer
Metastatic cancer
Lung nodule seen on imaging study

## 2018-03-01 NOTE — PROGRESS NOTE ADULT - PROBLEM SELECTOR PLAN 1
- Clinically improved. No fevers, well appearing, some cough with yellow phlegm that is easy to expectorate, not requiring supplemental oxygen.  - Completed course of cef/azithro  - continue duo nebs PRN  - Home O2 for ambulation as pt desaturates to 84% on RA with walking.  - s/p PET CT which demonstrated likely metastatic lung cancer (see below for plan)

## 2018-03-01 NOTE — PROGRESS NOTE ADULT - PROBLEM SELECTOR PLAN 2
- Evidence of disease within lung parenchyma as well as liver and bone  - Appreciate heme/onc consult   - s/p bronchoscopy with LN biopsy of 4R, prelim JÚNIOR + for cancer, possibly adeno (await official pathology read)  - F/U - Evidence of disease within lung parenchyma as well as liver and bone  - Appreciate heme/onc consult   - s/p bronchoscopy with LN biopsy of 4R, prelim JÚNIOR + for cancer, possibly adeno (await official pathology read)  - F/U with Dr. Thornton, please re-call with any questions.

## 2018-03-01 NOTE — PROGRESS NOTE ADULT - PROBLEM SELECTOR PLAN 1
-Patient saturating 98% on RA at rest, desaturating to 86% while ambulating, including with PT on 2/26/18.  On 2/28/18 she desaturated to as low as 84% while ambulating with PT.  Denies previous cardiac hx.  BNP slightly elevated to 634, however, previous BNP unavailable for reference.  Etiology likely 2/2 PNA, severe emphysema, and newly found lung CA.  -Echocardiogram shows EF 60-65% and no diastolic dysfunction  -Duoneb q 6  -PET scan shows likely metastatic lung CA, patient had bronchosocpy with BAL and linear EBUS for LN biopsy, will follow up results, MRI brain with no metastases  -Heme/onc consulted, obtained MRI brain, will follow up further recs

## 2018-03-01 NOTE — PROGRESS NOTE ADULT - PROBLEM SELECTOR PROBLEM 4
Acute gout of left ankle, unspecified cause
Acute gout of left ankle, unspecified cause
Elevated alkaline phosphatase level

## 2018-03-01 NOTE — PROGRESS NOTE ADULT - PROBLEM SELECTOR PROBLEM 8
Tuberculosis
Nutrition, metabolism, and development symptoms

## 2018-03-01 NOTE — PROGRESS NOTE ADULT - PROBLEM SELECTOR PROBLEM 6
Edema
HTN (hypertension)

## 2018-03-01 NOTE — PROGRESS NOTE ADULT - ASSESSMENT
82 yo F, PMH of COPD, multiple lung nodules, remote history of TB as a child, presented with SOB and cough 2/2 multilobar pneumonia now found to have metastatic likely primary lung cancer

## 2018-03-01 NOTE — PROGRESS NOTE ADULT - PROBLEM SELECTOR PLAN 9
-HSQ q8  -No GI prophylaxis indicated
-No IVF indicated  -Will replete to K>4 and Mg>2  -DASH/TLC diet  -Dispo RMF  -DNR
-HSQ q8  -No GI prophylaxis indicated

## 2018-03-02 VITALS
TEMPERATURE: 98 F | HEART RATE: 99 BPM | OXYGEN SATURATION: 98 % | SYSTOLIC BLOOD PRESSURE: 129 MMHG | DIASTOLIC BLOOD PRESSURE: 66 MMHG | RESPIRATION RATE: 19 BRPM

## 2018-03-02 LAB
ALBUMIN SERPL ELPH-MCNC: 3.2 G/DL — LOW (ref 3.3–5)
ALP SERPL-CCNC: 259 U/L — HIGH (ref 40–120)
ALT FLD-CCNC: 35 U/L — SIGNIFICANT CHANGE UP (ref 10–45)
ANION GAP SERPL CALC-SCNC: 12 MMOL/L — SIGNIFICANT CHANGE UP (ref 5–17)
AST SERPL-CCNC: 52 U/L — HIGH (ref 10–40)
BILIRUB SERPL-MCNC: 0.6 MG/DL — SIGNIFICANT CHANGE UP (ref 0.2–1.2)
BUN SERPL-MCNC: 17 MG/DL — SIGNIFICANT CHANGE UP (ref 7–23)
CALCIUM SERPL-MCNC: 9.6 MG/DL — SIGNIFICANT CHANGE UP (ref 8.4–10.5)
CHLORIDE SERPL-SCNC: 95 MMOL/L — LOW (ref 96–108)
CO2 SERPL-SCNC: 24 MMOL/L — SIGNIFICANT CHANGE UP (ref 22–31)
CREAT SERPL-MCNC: 0.97 MG/DL — SIGNIFICANT CHANGE UP (ref 0.5–1.3)
CULTURE RESULTS: SIGNIFICANT CHANGE UP
CULTURE RESULTS: SIGNIFICANT CHANGE UP
GLUCOSE SERPL-MCNC: 98 MG/DL — SIGNIFICANT CHANGE UP (ref 70–99)
HCT VFR BLD CALC: 34.6 % — SIGNIFICANT CHANGE UP (ref 34.5–45)
HGB BLD-MCNC: 11.4 G/DL — LOW (ref 11.5–15.5)
MAGNESIUM SERPL-MCNC: 1.9 MG/DL — SIGNIFICANT CHANGE UP (ref 1.6–2.6)
MCHC RBC-ENTMCNC: 28.6 PG — SIGNIFICANT CHANGE UP (ref 27–34)
MCHC RBC-ENTMCNC: 32.9 G/DL — SIGNIFICANT CHANGE UP (ref 32–36)
MCV RBC AUTO: 86.7 FL — SIGNIFICANT CHANGE UP (ref 80–100)
NON-GYNECOLOGICAL CYTOLOGY STUDY: SIGNIFICANT CHANGE UP
PLATELET # BLD AUTO: 371 K/UL — SIGNIFICANT CHANGE UP (ref 150–400)
POTASSIUM SERPL-MCNC: 4.7 MMOL/L — SIGNIFICANT CHANGE UP (ref 3.5–5.3)
POTASSIUM SERPL-SCNC: 4.7 MMOL/L — SIGNIFICANT CHANGE UP (ref 3.5–5.3)
PROT SERPL-MCNC: 6.8 G/DL — SIGNIFICANT CHANGE UP (ref 6–8.3)
RBC # BLD: 3.99 M/UL — SIGNIFICANT CHANGE UP (ref 3.8–5.2)
RBC # FLD: 14.9 % — SIGNIFICANT CHANGE UP (ref 10.3–16.9)
SODIUM SERPL-SCNC: 131 MMOL/L — LOW (ref 135–145)
SPECIMEN SOURCE: SIGNIFICANT CHANGE UP
SPECIMEN SOURCE: SIGNIFICANT CHANGE UP
WBC # BLD: 8.3 K/UL — SIGNIFICANT CHANGE UP (ref 3.8–10.5)
WBC # FLD AUTO: 8.3 K/UL — SIGNIFICANT CHANGE UP (ref 3.8–10.5)

## 2018-03-02 PROCEDURE — 71045 X-RAY EXAM CHEST 1 VIEW: CPT

## 2018-03-02 PROCEDURE — 94640 AIRWAY INHALATION TREATMENT: CPT

## 2018-03-02 PROCEDURE — 99285 EMERGENCY DEPT VISIT HI MDM: CPT | Mod: 25

## 2018-03-02 PROCEDURE — 85730 THROMBOPLASTIN TIME PARTIAL: CPT

## 2018-03-02 PROCEDURE — 71046 X-RAY EXAM CHEST 2 VIEWS: CPT

## 2018-03-02 PROCEDURE — 85027 COMPLETE CBC AUTOMATED: CPT

## 2018-03-02 PROCEDURE — 82550 ASSAY OF CK (CPK): CPT

## 2018-03-02 PROCEDURE — 83880 ASSAY OF NATRIURETIC PEPTIDE: CPT

## 2018-03-02 PROCEDURE — 87070 CULTURE OTHR SPECIMN AEROBIC: CPT

## 2018-03-02 PROCEDURE — 82977 ASSAY OF GGT: CPT

## 2018-03-02 PROCEDURE — 88173 CYTOPATH EVAL FNA REPORT: CPT

## 2018-03-02 PROCEDURE — 97110 THERAPEUTIC EXERCISES: CPT

## 2018-03-02 PROCEDURE — 71250 CT THORAX DX C-: CPT

## 2018-03-02 PROCEDURE — 93306 TTE W/DOPPLER COMPLETE: CPT

## 2018-03-02 PROCEDURE — 88112 CYTOPATH CELL ENHANCE TECH: CPT

## 2018-03-02 PROCEDURE — 80053 COMPREHEN METABOLIC PANEL: CPT

## 2018-03-02 PROCEDURE — A9585: CPT

## 2018-03-02 PROCEDURE — 93970 EXTREMITY STUDY: CPT

## 2018-03-02 PROCEDURE — 82553 CREATINE MB FRACTION: CPT

## 2018-03-02 PROCEDURE — 70553 MRI BRAIN STEM W/O & W/DYE: CPT

## 2018-03-02 PROCEDURE — 84484 ASSAY OF TROPONIN QUANT: CPT

## 2018-03-02 PROCEDURE — 83735 ASSAY OF MAGNESIUM: CPT

## 2018-03-02 PROCEDURE — 99239 HOSP IP/OBS DSCHRG MGMT >30: CPT

## 2018-03-02 PROCEDURE — 87206 SMEAR FLUORESCENT/ACID STAI: CPT

## 2018-03-02 PROCEDURE — A9552: CPT

## 2018-03-02 PROCEDURE — 85025 COMPLETE CBC W/AUTO DIFF WBC: CPT

## 2018-03-02 PROCEDURE — 87389 HIV-1 AG W/HIV-1&-2 AB AG IA: CPT

## 2018-03-02 PROCEDURE — 88305 TISSUE EXAM BY PATHOLOGIST: CPT

## 2018-03-02 PROCEDURE — 82962 GLUCOSE BLOOD TEST: CPT

## 2018-03-02 PROCEDURE — 93005 ELECTROCARDIOGRAM TRACING: CPT

## 2018-03-02 PROCEDURE — 73610 X-RAY EXAM OF ANKLE: CPT

## 2018-03-02 PROCEDURE — 87116 MYCOBACTERIA CULTURE: CPT

## 2018-03-02 PROCEDURE — 97530 THERAPEUTIC ACTIVITIES: CPT

## 2018-03-02 PROCEDURE — 89051 BODY FLUID CELL COUNT: CPT

## 2018-03-02 PROCEDURE — 36415 COLL VENOUS BLD VENIPUNCTURE: CPT

## 2018-03-02 PROCEDURE — 78815 PET IMAGE W/CT SKULL-THIGH: CPT

## 2018-03-02 PROCEDURE — 85610 PROTHROMBIN TIME: CPT

## 2018-03-02 PROCEDURE — 87015 SPECIMEN INFECT AGNT CONCNTJ: CPT

## 2018-03-02 PROCEDURE — 87102 FUNGUS ISOLATION CULTURE: CPT

## 2018-03-02 PROCEDURE — 88341 IMHCHEM/IMCYTCHM EA ADD ANTB: CPT

## 2018-03-02 PROCEDURE — 97116 GAIT TRAINING THERAPY: CPT

## 2018-03-02 PROCEDURE — 97161 PT EVAL LOW COMPLEX 20 MIN: CPT

## 2018-03-02 RX ORDER — LOSARTAN POTASSIUM 100 MG/1
1 TABLET, FILM COATED ORAL
Qty: 30 | Refills: 0 | OUTPATIENT
Start: 2018-03-02 | End: 2018-03-31

## 2018-03-02 RX ORDER — LOSARTAN POTASSIUM 100 MG/1
1 TABLET, FILM COATED ORAL
Qty: 0 | Refills: 0 | COMMUNITY
Start: 2018-03-02

## 2018-03-02 RX ORDER — IPRATROPIUM/ALBUTEROL SULFATE 18-103MCG
3 AEROSOL WITH ADAPTER (GRAM) INHALATION
Qty: 3 | Refills: 0 | OUTPATIENT
Start: 2018-03-02 | End: 2018-03-31

## 2018-03-02 RX ORDER — AMLODIPINE BESYLATE 2.5 MG/1
1 TABLET ORAL
Qty: 0 | Refills: 0 | COMMUNITY

## 2018-03-02 RX ORDER — AMLODIPINE BESYLATE 2.5 MG/1
1 TABLET ORAL
Qty: 0 | Refills: 0 | COMMUNITY
Start: 2018-03-02

## 2018-03-02 RX ORDER — ONDANSETRON 8 MG/1
4 TABLET, FILM COATED ORAL ONCE
Qty: 0 | Refills: 0 | Status: COMPLETED | OUTPATIENT
Start: 2018-03-02 | End: 2018-03-02

## 2018-03-02 RX ORDER — IPRATROPIUM/ALBUTEROL SULFATE 18-103MCG
3 AEROSOL WITH ADAPTER (GRAM) INHALATION
Qty: 120 | Refills: 0 | OUTPATIENT
Start: 2018-03-02 | End: 2018-03-31

## 2018-03-02 RX ORDER — COLCHICINE 0.6 MG
1 TABLET ORAL
Qty: 7 | Refills: 0 | OUTPATIENT
Start: 2018-03-02 | End: 2018-03-08

## 2018-03-02 RX ADMIN — ONDANSETRON 4 MILLIGRAM(S): 8 TABLET, FILM COATED ORAL at 00:52

## 2018-03-02 RX ADMIN — LOSARTAN POTASSIUM 50 MILLIGRAM(S): 100 TABLET, FILM COATED ORAL at 07:24

## 2018-03-02 RX ADMIN — AMLODIPINE BESYLATE 5 MILLIGRAM(S): 2.5 TABLET ORAL at 07:24

## 2018-03-02 RX ADMIN — HEPARIN SODIUM 5000 UNIT(S): 5000 INJECTION INTRAVENOUS; SUBCUTANEOUS at 07:24

## 2018-03-02 RX ADMIN — Medication 3 MILLILITER(S): at 12:17

## 2018-03-02 RX ADMIN — Medication 3 MILLILITER(S): at 00:52

## 2018-03-02 RX ADMIN — Medication 3 MILLILITER(S): at 07:24

## 2018-03-02 RX ADMIN — Medication 0.6 MILLIGRAM(S): at 12:17

## 2018-03-02 RX ADMIN — HEPARIN SODIUM 5000 UNIT(S): 5000 INJECTION INTRAVENOUS; SUBCUTANEOUS at 14:30

## 2018-03-05 ENCOUNTER — APPOINTMENT (OUTPATIENT)
Dept: PULMONOLOGY | Facility: CLINIC | Age: 83
End: 2018-03-05

## 2018-03-09 DIAGNOSIS — C79.51 SECONDARY MALIGNANT NEOPLASM OF BONE: ICD-10-CM

## 2018-03-09 DIAGNOSIS — Z87.891 PERSONAL HISTORY OF NICOTINE DEPENDENCE: ICD-10-CM

## 2018-03-09 DIAGNOSIS — C79.89 SECONDARY MALIGNANT NEOPLASM OF OTHER SPECIFIED SITES: ICD-10-CM

## 2018-03-09 DIAGNOSIS — J43.9 EMPHYSEMA, UNSPECIFIED: ICD-10-CM

## 2018-03-09 DIAGNOSIS — Z86.11 PERSONAL HISTORY OF TUBERCULOSIS: ICD-10-CM

## 2018-03-09 DIAGNOSIS — C78.7 SECONDARY MALIGNANT NEOPLASM OF LIVER AND INTRAHEPATIC BILE DUCT: ICD-10-CM

## 2018-03-09 DIAGNOSIS — J18.8 OTHER PNEUMONIA, UNSPECIFIED ORGANISM: ICD-10-CM

## 2018-03-09 DIAGNOSIS — R06.02 SHORTNESS OF BREATH: ICD-10-CM

## 2018-03-09 DIAGNOSIS — Z66 DO NOT RESUSCITATE: ICD-10-CM

## 2018-03-09 DIAGNOSIS — C34.90 MALIGNANT NEOPLASM OF UNSPECIFIED PART OF UNSPECIFIED BRONCHUS OR LUNG: ICD-10-CM

## 2018-03-09 DIAGNOSIS — M10.9 GOUT, UNSPECIFIED: ICD-10-CM

## 2018-03-09 DIAGNOSIS — I10 ESSENTIAL (PRIMARY) HYPERTENSION: ICD-10-CM

## 2018-03-11 ENCOUNTER — INPATIENT (INPATIENT)
Facility: HOSPITAL | Age: 83
LOS: 2 days | DRG: 175 | End: 2018-03-14
Attending: INTERNAL MEDICINE | Admitting: INTERNAL MEDICINE
Payer: MEDICARE

## 2018-03-11 VITALS
HEART RATE: 138 BPM | DIASTOLIC BLOOD PRESSURE: 98 MMHG | WEIGHT: 130.95 LBS | SYSTOLIC BLOOD PRESSURE: 126 MMHG | OXYGEN SATURATION: 96 % | RESPIRATION RATE: 30 BRPM

## 2018-03-11 DIAGNOSIS — I10 ESSENTIAL (PRIMARY) HYPERTENSION: ICD-10-CM

## 2018-03-11 DIAGNOSIS — Z29.9 ENCOUNTER FOR PROPHYLACTIC MEASURES, UNSPECIFIED: ICD-10-CM

## 2018-03-11 DIAGNOSIS — J96.02 ACUTE RESPIRATORY FAILURE WITH HYPERCAPNIA: ICD-10-CM

## 2018-03-11 DIAGNOSIS — R09.02 HYPOXEMIA: ICD-10-CM

## 2018-03-11 DIAGNOSIS — C34.90 MALIGNANT NEOPLASM OF UNSPECIFIED PART OF UNSPECIFIED BRONCHUS OR LUNG: ICD-10-CM

## 2018-03-11 DIAGNOSIS — R63.8 OTHER SYMPTOMS AND SIGNS CONCERNING FOOD AND FLUID INTAKE: ICD-10-CM

## 2018-03-11 DIAGNOSIS — R79.89 OTHER SPECIFIED ABNORMAL FINDINGS OF BLOOD CHEMISTRY: ICD-10-CM

## 2018-03-11 DIAGNOSIS — N17.9 ACUTE KIDNEY FAILURE, UNSPECIFIED: ICD-10-CM

## 2018-03-11 DIAGNOSIS — Z51.5 ENCOUNTER FOR PALLIATIVE CARE: ICD-10-CM

## 2018-03-11 DIAGNOSIS — Z98.890 OTHER SPECIFIED POSTPROCEDURAL STATES: Chronic | ICD-10-CM

## 2018-03-11 DIAGNOSIS — I48.91 UNSPECIFIED ATRIAL FIBRILLATION: ICD-10-CM

## 2018-03-11 DIAGNOSIS — E87.2 ACIDOSIS: ICD-10-CM

## 2018-03-11 LAB
ALBUMIN SERPL ELPH-MCNC: 3.6 G/DL — SIGNIFICANT CHANGE UP (ref 3.3–5)
ALP SERPL-CCNC: 367 U/L — HIGH (ref 40–120)
ALT FLD-CCNC: 99 U/L — HIGH (ref 10–45)
ANION GAP SERPL CALC-SCNC: 15 MMOL/L — SIGNIFICANT CHANGE UP (ref 5–17)
APPEARANCE UR: CLEAR — SIGNIFICANT CHANGE UP
APTT BLD: 30.8 SEC — SIGNIFICANT CHANGE UP (ref 27.5–37.4)
AST SERPL-CCNC: 96 U/L — HIGH (ref 10–40)
BACTERIA # UR AUTO: PRESENT /HPF
BASE EXCESS BLDA CALC-SCNC: -9.8 MMOL/L — LOW (ref -2–3)
BASE EXCESS BLDV CALC-SCNC: -3 MMOL/L — SIGNIFICANT CHANGE UP
BASOPHILS NFR BLD AUTO: 0.3 % — SIGNIFICANT CHANGE UP (ref 0–2)
BILIRUB SERPL-MCNC: 0.6 MG/DL — SIGNIFICANT CHANGE UP (ref 0.2–1.2)
BILIRUB UR-MCNC: NEGATIVE — SIGNIFICANT CHANGE UP
BUN SERPL-MCNC: 27 MG/DL — HIGH (ref 7–23)
CALCIUM SERPL-MCNC: 9.9 MG/DL — SIGNIFICANT CHANGE UP (ref 8.4–10.5)
CHLORIDE SERPL-SCNC: 96 MMOL/L — SIGNIFICANT CHANGE UP (ref 96–108)
CO2 SERPL-SCNC: 25 MMOL/L — SIGNIFICANT CHANGE UP (ref 22–31)
COLOR SPEC: YELLOW — SIGNIFICANT CHANGE UP
CREAT SERPL-MCNC: 1.33 MG/DL — HIGH (ref 0.5–1.3)
DIFF PNL FLD: NEGATIVE — SIGNIFICANT CHANGE UP
EOSINOPHIL NFR BLD AUTO: 4.6 % — SIGNIFICANT CHANGE UP (ref 0–6)
EPI CELLS # UR: SIGNIFICANT CHANGE UP /HPF (ref 0–5)
GAS PNL BLDA: SIGNIFICANT CHANGE UP
GAS PNL BLDV: SIGNIFICANT CHANGE UP
GLUCOSE SERPL-MCNC: 165 MG/DL — HIGH (ref 70–99)
GLUCOSE UR QL: NEGATIVE — SIGNIFICANT CHANGE UP
HCO3 BLDA-SCNC: 18 MMOL/L — LOW (ref 21–28)
HCO3 BLDV-SCNC: 26 MMOL/L — SIGNIFICANT CHANGE UP (ref 20–27)
HCT VFR BLD CALC: 40.1 % — SIGNIFICANT CHANGE UP (ref 34.5–45)
HGB BLD-MCNC: 13.3 G/DL — SIGNIFICANT CHANGE UP (ref 11.5–15.5)
HYALINE CASTS # UR AUTO: (no result) /LPF (ref 0–2)
INR BLD: 1.08 — SIGNIFICANT CHANGE UP (ref 0.88–1.16)
KETONES UR-MCNC: NEGATIVE — SIGNIFICANT CHANGE UP
LACTATE SERPL-SCNC: 2.7 MMOL/L — HIGH (ref 0.5–2)
LACTATE SERPL-SCNC: 2.8 MMOL/L — HIGH (ref 0.5–2)
LEUKOCYTE ESTERASE UR-ACNC: NEGATIVE — SIGNIFICANT CHANGE UP
LYMPHOCYTES # BLD AUTO: 21.7 % — SIGNIFICANT CHANGE UP (ref 13–44)
MCHC RBC-ENTMCNC: 29 PG — SIGNIFICANT CHANGE UP (ref 27–34)
MCHC RBC-ENTMCNC: 33.2 G/DL — SIGNIFICANT CHANGE UP (ref 32–36)
MCV RBC AUTO: 87.4 FL — SIGNIFICANT CHANGE UP (ref 80–100)
MONOCYTES NFR BLD AUTO: 7.2 % — SIGNIFICANT CHANGE UP (ref 2–14)
NEUTROPHILS NFR BLD AUTO: 66.2 % — SIGNIFICANT CHANGE UP (ref 43–77)
NITRITE UR-MCNC: NEGATIVE — SIGNIFICANT CHANGE UP
NT-PROBNP SERPL-SCNC: 419 PG/ML — HIGH (ref 0–300)
PCO2 BLDA: 50 MMHG — HIGH (ref 32–45)
PCO2 BLDV: 67 MMHG — HIGH (ref 41–51)
PH BLDA: 7.18 — CRITICAL LOW (ref 7.35–7.45)
PH BLDV: 7.21 — CRITICAL LOW (ref 7.32–7.43)
PH UR: 5.5 — SIGNIFICANT CHANGE UP (ref 5–8)
PLATELET # BLD AUTO: 295 K/UL — SIGNIFICANT CHANGE UP (ref 150–400)
PO2 BLDA: 78 MMHG — LOW (ref 83–108)
PO2 BLDV: 30 MMHG — SIGNIFICANT CHANGE UP
POTASSIUM SERPL-MCNC: 3.7 MMOL/L — SIGNIFICANT CHANGE UP (ref 3.5–5.3)
POTASSIUM SERPL-SCNC: 3.7 MMOL/L — SIGNIFICANT CHANGE UP (ref 3.5–5.3)
PROT SERPL-MCNC: 7.6 G/DL — SIGNIFICANT CHANGE UP (ref 6–8.3)
PROT UR-MCNC: 100 MG/DL
PROTHROM AB SERPL-ACNC: 12 SEC — SIGNIFICANT CHANGE UP (ref 9.8–12.7)
RBC # BLD: 4.59 M/UL — SIGNIFICANT CHANGE UP (ref 3.8–5.2)
RBC # FLD: 14.9 % — SIGNIFICANT CHANGE UP (ref 10.3–16.9)
RBC CASTS # UR COMP ASSIST: < 5 /HPF — SIGNIFICANT CHANGE UP
SAO2 % BLDA: 92 % — LOW (ref 95–100)
SAO2 % BLDV: 38 % — SIGNIFICANT CHANGE UP
SODIUM SERPL-SCNC: 136 MMOL/L — SIGNIFICANT CHANGE UP (ref 135–145)
SP GR SPEC: >=1.03 — SIGNIFICANT CHANGE UP (ref 1–1.03)
TROPONIN T SERPL-MCNC: 0.04 NG/ML — HIGH (ref 0–0.01)
UROBILINOGEN FLD QL: 0.2 E.U./DL — SIGNIFICANT CHANGE UP
WBC # BLD: 10.1 K/UL — SIGNIFICANT CHANGE UP (ref 3.8–10.5)
WBC # FLD AUTO: 10.1 K/UL — SIGNIFICANT CHANGE UP (ref 3.8–10.5)
WBC UR QL: (no result) /HPF

## 2018-03-11 PROCEDURE — 99291 CRITICAL CARE FIRST HOUR: CPT | Mod: 25

## 2018-03-11 PROCEDURE — 71045 X-RAY EXAM CHEST 1 VIEW: CPT | Mod: 26

## 2018-03-11 PROCEDURE — 93010 ELECTROCARDIOGRAM REPORT: CPT

## 2018-03-11 PROCEDURE — 99223 1ST HOSP IP/OBS HIGH 75: CPT | Mod: GC

## 2018-03-11 RX ORDER — LOSARTAN/HYDROCHLOROTHIAZIDE 100MG-25MG
1 TABLET ORAL
Qty: 0 | Refills: 0 | COMMUNITY

## 2018-03-11 RX ORDER — SODIUM CHLORIDE 9 MG/ML
1000 INJECTION INTRAMUSCULAR; INTRAVENOUS; SUBCUTANEOUS ONCE
Qty: 0 | Refills: 0 | Status: COMPLETED | OUTPATIENT
Start: 2018-03-11 | End: 2018-03-11

## 2018-03-11 RX ORDER — VANCOMYCIN HCL 1 G
VIAL (EA) INTRAVENOUS
Qty: 0 | Refills: 0 | Status: DISCONTINUED | OUTPATIENT
Start: 2018-03-11 | End: 2018-03-13

## 2018-03-11 RX ORDER — SODIUM CHLORIDE 9 MG/ML
1000 INJECTION INTRAMUSCULAR; INTRAVENOUS; SUBCUTANEOUS
Qty: 0 | Refills: 0 | Status: DISCONTINUED | OUTPATIENT
Start: 2018-03-11 | End: 2018-03-12

## 2018-03-11 RX ORDER — ENOXAPARIN SODIUM 100 MG/ML
60 INJECTION SUBCUTANEOUS EVERY 24 HOURS
Qty: 0 | Refills: 0 | Status: DISCONTINUED | OUTPATIENT
Start: 2018-03-12 | End: 2018-03-13

## 2018-03-11 RX ORDER — PIPERACILLIN AND TAZOBACTAM 4; .5 G/20ML; G/20ML
3.38 INJECTION, POWDER, LYOPHILIZED, FOR SOLUTION INTRAVENOUS ONCE
Qty: 0 | Refills: 0 | Status: COMPLETED | OUTPATIENT
Start: 2018-03-11 | End: 2018-03-11

## 2018-03-11 RX ORDER — ALBUTEROL 90 UG/1
2.5 AEROSOL, METERED ORAL ONCE
Qty: 0 | Refills: 0 | Status: COMPLETED | OUTPATIENT
Start: 2018-03-11 | End: 2018-03-11

## 2018-03-11 RX ORDER — MORPHINE SULFATE 50 MG/1
1 CAPSULE, EXTENDED RELEASE ORAL ONCE
Qty: 0 | Refills: 0 | Status: DISCONTINUED | OUTPATIENT
Start: 2018-03-11 | End: 2018-03-11

## 2018-03-11 RX ORDER — VANCOMYCIN HCL 1 G
750 VIAL (EA) INTRAVENOUS ONCE
Qty: 0 | Refills: 0 | Status: COMPLETED | OUTPATIENT
Start: 2018-03-11 | End: 2018-03-11

## 2018-03-11 RX ORDER — HYDROMORPHONE HYDROCHLORIDE 2 MG/ML
0.5 INJECTION INTRAMUSCULAR; INTRAVENOUS; SUBCUTANEOUS EVERY 4 HOURS
Qty: 0 | Refills: 0 | Status: DISCONTINUED | OUTPATIENT
Start: 2018-03-11 | End: 2018-03-12

## 2018-03-11 RX ORDER — MORPHINE SULFATE 50 MG/1
2 CAPSULE, EXTENDED RELEASE ORAL ONCE
Qty: 0 | Refills: 0 | Status: DISCONTINUED | OUTPATIENT
Start: 2018-03-11 | End: 2018-03-11

## 2018-03-11 RX ORDER — PIPERACILLIN AND TAZOBACTAM 4; .5 G/20ML; G/20ML
2.25 INJECTION, POWDER, LYOPHILIZED, FOR SOLUTION INTRAVENOUS EVERY 6 HOURS
Qty: 0 | Refills: 0 | Status: DISCONTINUED | OUTPATIENT
Start: 2018-03-11 | End: 2018-03-13

## 2018-03-11 RX ORDER — IPRATROPIUM/ALBUTEROL SULFATE 18-103MCG
3 AEROSOL WITH ADAPTER (GRAM) INHALATION EVERY 6 HOURS
Qty: 0 | Refills: 0 | Status: DISCONTINUED | OUTPATIENT
Start: 2018-03-11 | End: 2018-03-14

## 2018-03-11 RX ORDER — METOPROLOL TARTRATE 50 MG
5 TABLET ORAL ONCE
Qty: 0 | Refills: 0 | Status: COMPLETED | OUTPATIENT
Start: 2018-03-11 | End: 2018-03-11

## 2018-03-11 RX ORDER — ENOXAPARIN SODIUM 100 MG/ML
60 INJECTION SUBCUTANEOUS ONCE
Qty: 0 | Refills: 0 | Status: COMPLETED | OUTPATIENT
Start: 2018-03-11 | End: 2018-03-11

## 2018-03-11 RX ORDER — VANCOMYCIN HCL 1 G
750 VIAL (EA) INTRAVENOUS EVERY 24 HOURS
Qty: 0 | Refills: 0 | Status: DISCONTINUED | OUTPATIENT
Start: 2018-03-12 | End: 2018-03-13

## 2018-03-11 RX ORDER — PIPERACILLIN AND TAZOBACTAM 4; .5 G/20ML; G/20ML
3.38 INJECTION, POWDER, LYOPHILIZED, FOR SOLUTION INTRAVENOUS EVERY 6 HOURS
Qty: 0 | Refills: 0 | Status: DISCONTINUED | OUTPATIENT
Start: 2018-03-11 | End: 2018-03-11

## 2018-03-11 RX ORDER — MORPHINE SULFATE 50 MG/1
1 CAPSULE, EXTENDED RELEASE ORAL EVERY 4 HOURS
Qty: 0 | Refills: 0 | Status: DISCONTINUED | OUTPATIENT
Start: 2018-03-11 | End: 2018-03-11

## 2018-03-11 RX ADMIN — Medication 3 MILLILITER(S): at 22:14

## 2018-03-11 RX ADMIN — MORPHINE SULFATE 2 MILLIGRAM(S): 50 CAPSULE, EXTENDED RELEASE ORAL at 15:20

## 2018-03-11 RX ADMIN — PIPERACILLIN AND TAZOBACTAM 200 GRAM(S): 4; .5 INJECTION, POWDER, LYOPHILIZED, FOR SOLUTION INTRAVENOUS at 14:40

## 2018-03-11 RX ADMIN — MORPHINE SULFATE 2 MILLIGRAM(S): 50 CAPSULE, EXTENDED RELEASE ORAL at 16:33

## 2018-03-11 RX ADMIN — HYDROMORPHONE HYDROCHLORIDE 0.5 MILLIGRAM(S): 2 INJECTION INTRAMUSCULAR; INTRAVENOUS; SUBCUTANEOUS at 20:28

## 2018-03-11 RX ADMIN — SODIUM CHLORIDE 50 MILLILITER(S): 9 INJECTION INTRAMUSCULAR; INTRAVENOUS; SUBCUTANEOUS at 19:58

## 2018-03-11 RX ADMIN — SODIUM CHLORIDE 1000 MILLILITER(S): 9 INJECTION INTRAMUSCULAR; INTRAVENOUS; SUBCUTANEOUS at 13:55

## 2018-03-11 RX ADMIN — PIPERACILLIN AND TAZOBACTAM 200 GRAM(S): 4; .5 INJECTION, POWDER, LYOPHILIZED, FOR SOLUTION INTRAVENOUS at 19:31

## 2018-03-11 RX ADMIN — SODIUM CHLORIDE 1000 MILLILITER(S): 9 INJECTION INTRAMUSCULAR; INTRAVENOUS; SUBCUTANEOUS at 14:05

## 2018-03-11 RX ADMIN — MORPHINE SULFATE 2 MILLIGRAM(S): 50 CAPSULE, EXTENDED RELEASE ORAL at 16:20

## 2018-03-11 RX ADMIN — HYDROMORPHONE HYDROCHLORIDE 0.5 MILLIGRAM(S): 2 INJECTION INTRAMUSCULAR; INTRAVENOUS; SUBCUTANEOUS at 19:57

## 2018-03-11 RX ADMIN — Medication 250 MILLIGRAM(S): at 21:00

## 2018-03-11 RX ADMIN — ALBUTEROL 2.5 MILLIGRAM(S): 90 AEROSOL, METERED ORAL at 14:50

## 2018-03-11 RX ADMIN — ENOXAPARIN SODIUM 60 MILLIGRAM(S): 100 INJECTION SUBCUTANEOUS at 15:53

## 2018-03-11 RX ADMIN — Medication 5 MILLIGRAM(S): at 13:55

## 2018-03-11 RX ADMIN — MORPHINE SULFATE 2 MILLIGRAM(S): 50 CAPSULE, EXTENDED RELEASE ORAL at 15:00

## 2018-03-11 RX ADMIN — Medication 125 MILLIGRAM(S): at 14:50

## 2018-03-11 NOTE — H&P ADULT - PMH
HTN (hypertension)    Lung cancer metastatic to bone    Lung nodule seen on imaging study    Tuberculosis

## 2018-03-11 NOTE — H&P ADULT - PROBLEM SELECTOR PLAN 4
Cr elevated to 1.33 from baseline of 0.9 without AG, pt initially retaining urine and not able to void, most likely prerenal in the setting of poor PO intake.  - Padilla placement  - avoid nephrotoxic agents, strict I/Os  - continue with gentle hydration 50 cc/h  - repeat BMP in am, monitor lytes and replete as needed no hx of a-fib, pt s/p 5 mg IV lopressor in ED, currently rate controlled  [id number] ~ 4 given current disease prognosis no concern for stroke prevention, however pt on AC torres empiric  Rx of PE.

## 2018-03-11 NOTE — ED ADULT NURSE REASSESSMENT NOTE - NS ED NURSE REASSESS COMMENT FT1
Patient given metoprolol 5mg IV for heartrate. Patient became increased tachypnic, not tolerated bipap, states that she cannot breath. Moved into the resus room. Patient currently on non-rebreather.

## 2018-03-11 NOTE — H&P ADULT - ATTENDING COMMENTS
patient seen and examined, with family present in room; pt more awake, answering questions, on NRB, reports improvement in sxs since being hospitalized though feels dyspnea, denies pain     reviewed vs, labs, available radiological reports/ studies, ekg     agree w/ PE findings as above, pt is thin chronic ill appearing, eyes closed, tired appearing, but answers questions, on NRB, not tachypneic.     1. acute respiratory failure in setting of decompensation from known lung cancer w/ hepatic mets; concern for PE. Goals of care addressed in ED, pt made DNR/DNI given poor prognosis and to initiate comfort care. I spoke to HCP (grandson), confirming pt's wishes. However to continue VS, w/ gentle hydration, IV abx for possible PNA, lovenox as well for presumed PE. No MEWS or lab draws. Palliative consult in AM to reassess and readdress comfort care goals. Pt switched to dilaudid prn dyspnea given decreased renal function.    2. hold bp medications; on pureed diet

## 2018-03-11 NOTE — H&P ADULT - NSHPPHYSICALEXAM_GEN_ALL_CORE
.  VITAL SIGNS:  T(C): 35.4 (03-11-18 @ 16:18), Max: 36.8 (03-11-18 @ 13:17)  T(F): 95.7 (03-11-18 @ 16:18), Max: 98.2 (03-11-18 @ 13:17)  HR: 108 (03-11-18 @ 18:22) (104 - 138)  BP: 103/64 (03-11-18 @ 16:18) (92/50 - 128/67)  RR: 30 (03-11-18 @ 16:18) (28 - 32)  SpO2: 96% (03-11-18 @ 18:22) (86% - 100%)      PHYSICAL EXAM:    Constitutional: lethargic however follows simple commands, opens eyes to voice, temporal & peripheral muscle wasting present   HEENT: PERRL, EOMI, No thrush, no LAP, tracheostomy site well healed, mucous membrane dry   Neck: supple; JVD +  Respiratory: decreased right sided breaths sounds up to mid lung, otherwise CTA  Cardiac: tachycardic at high 90s, iregular, no MGR  Gastrointestinal: soft, NT/ND; no rebound or guarding; +BS  Extremities: cool, 1+ LE edema L>R, palpable symmetric pulses x4  Lymphatic: no submandibular or cervical LAD  Neurologic: awake, follows commands, opens eyes to voice however very lethargic     T/L/D: PIVs c/d/i, arreguin cath draining urine

## 2018-03-11 NOTE — H&P ADULT - NSHPLABSRESULTS_GEN_ALL_CORE
LABS:                         13.3   10.1  )-----------( 295      ( 11 Mar 2018 13:12 )             40.1     03-11    136  |  96  |  27<H>  ----------------------------<  165<H>  3.7   |  25  |  1.33<H>    Ca    9.9      11 Mar 2018 13:13    TPro  7.6  /  Alb  3.6  /  TBili  0.6  /  DBili  x   /  AST  96<H>  /  ALT  99<H>  /  AlkPhos  367<H>  03-11    PT/INR - ( 11 Mar 2018 13:12 )   PT: 12.0 sec;   INR: 1.08          PTT - ( 11 Mar 2018 13:12 )  PTT:30.8 sec  Urinalysis Basic - ( 11 Mar 2018 17:20 )    Color: x / Appearance: x / SG: x / pH: x  Gluc: x / Ketone: x  / Bili: x / Urobili: x   Blood: x / Protein: x / Nitrite: x   Leuk Esterase: x / RBC: < 5 /HPF / WBC 5-10 /HPF   Sq Epi: x / Non Sq Epi: 0-5 /HPF / Bacteria: Present /HPF      CARDIAC MARKERS ( 11 Mar 2018 13:13 )  x     / 0.04 ng/mL / x     / x     / x          Serum Pro-Brain Natriuretic Peptide: 419 pg/mL (03-11 @ 13:13)    Lactate, Blood: 2.8 mmoL/L (03-11 @ 15:36)  Lactate, Blood: 2.7 mmoL/L (03-11 @ 13:13)    RADIOLOGY, EKG & ADDITIONAL TESTS: Reviewed.

## 2018-03-11 NOTE — H&P ADULT - PROBLEM SELECTOR PLAN 10
pt dehydrated on exam with evidence of protein-calorie malnutrition  - cont gentle hydration with IVF  - ok to feed if passes bedside swallow eval with puree diet and HOB elevation   - monitor kidney function and lytes and replete as needed

## 2018-03-11 NOTE — H&P ADULT - NSHPREVIEWOFSYSTEMS_GEN_ALL_CORE
unable to obtain from patient as very lethargic, family denies fever/ chills/ cough/ hemoptysis/ diarrhea/ abdominal pain/ N&V, reporting gradual deconditioning ans significant weight loss over the past month

## 2018-03-11 NOTE — H&P ADULT - PROBLEM SELECTOR PLAN 2
new dx of stage for lung CA, Non small cell favoring adenocarcinoma with liver and bone mets.  - palliative consult in am see above

## 2018-03-11 NOTE — ED ADULT NURSE NOTE - OBJECTIVE STATEMENT
Patient presents to the ED via EMS, grandson at bedside, complaining of sudden onset of shortness of breath today, on home oxygen 3L nc. EMS states that on 3L, oxygen saturation was 76%. Recently diagnosis of lung cancer, with metastasis to liver and spine as per EMS. Swelling noted to bilateral lower legs, more prominent to left leg. Patient states that she has gout. On non-rebreather. Denies any fever or chills.

## 2018-03-11 NOTE — ED ADULT NURSE REASSESSMENT NOTE - NS ED NURSE REASSESS COMMENT FT1
pt placed on back on the Bipap machine with Ipap of 10, Epap 5, fio2 of 80%.  Pt code status discussed with health care proxy and Dr. Israel.  pt made of DNR/DNI at this time.  DR. Israel and resident discussed with grandson

## 2018-03-11 NOTE — H&P ADULT - ASSESSMENT
A/P    82 yo AAF presenting to ED for new onset severe SOB, PMH notable for heavy smoking Hx, Hx of pulmonary nodule and emphysema, new diagnosis of NSCL CA, in Feb 2018.  Impression:  presentation highly concerning for pulmonary emboli, given new onset SOB, in the setting of active cancer, hypoxia correcting with supplemental O2 vs Pneumonia CXR slightly worsened since previous admission  however pt denies any cough or febrile illness, no leukocytosis under labs vs cardiac etiologies however less likely as pt had a SHELLI recently with normal EF and no sig valvular disease, however with mod to severe pulm HTN vs worsening of pulmonary HTN    pt code status discussed with Jad, HCP and plan is for comfort care measures, however ok for IVF trial, ABx and anticoagulation.

## 2018-03-11 NOTE — ED PROVIDER NOTE - SECONDARY DIAGNOSIS.
Atrial fibrillation, unspecified type Primary malignant neoplasm of lung metastatic to other site, unspecified laterality

## 2018-03-11 NOTE — H&P ADULT - HISTORY OF PRESENT ILLNESS
84 yo AAF presenting to [facility] ED for acute onset severe SOB.  PMH is notable for emphysema/1/2 PPD x 60 years, quit last month, hx of TB as a child, recent admission at [facility] in feb for multilobar pneumonia initially concerning for postobstructive pneumonia in the setting of worsening Lung nodule, had a bronch and BAL was found to be positive for malignant cell,  non- small cell malignancy favoring adenocarcinoma, with evidences of liver and spinal metastasis, pt treated for pneumonia and discharged on home o2 with hem/onc follow up, she was BIBA for sudden onset of severe sob ~ 15 min, with no clear provocations or alleviations, in the ED she was tachycardic and hypoxic to 76% on 3 L NC< which improved on NRB, she was in AFib RVR at 130s, received 5 mg of IV lopressor which dropped the pressure, also had a lactate of 2.7 and received 2 L of IVF  CXR unchanged from previous admission however concerning for RML infiltrate, pt received zosyn and solumedrol, also ABG with PH 7.18/ 50/18/78, plan for intubation, ICU consult called with83 yo AAF presenting to [facility] ED for acute onset severe SOB.  PMH is notable for emphysema/1/2 PPD x 60 years, quit last month, hx of TB as a child, recent admission at [facility] in feb for multilobar pneumonia initially concerning for postobstructive pneumonia in the setting of worsening Lung nodule, had a bronch and BAL was found to be positive for malignant cell,  non- small cell malignancy favoring adenocarcinoma, with evidences of liver and spinal metastasis, pt treated for pneumonia and discharged on home o2 with hem/onc follow up, she was BIBA for sudden onset of severe sob ~ 15 min, with no clear provocations or alleviations, in the ED she was tachycardic and hypoxic to 76% on 3 L NC< which improved on NRB, she was in AFib RVR at 130s, received 5 mg of IV lopressor which dropped the pressure, also had a lactate of 2.7 and received 2 L of IVF. CTPE ordered however pt unable to tolerate lying flat  CXR unchanged from previous admission however concerning for RML infiltrate, pt received zosyn and solumedrol, also ABG with PH 7.18/ 50/18/78, plan for intubation, ICU consult called, pt's prognosis and disease course discussed with HCP and pt's code status chenged to comfort measures.  pt seen and examined in the ED still tachypneic, saturating 94% on NRB, HR at 95, afebrile, awake but very lethargic somewhat following commands, lung exam remarkable for decreased breath sounds on the right side, abdomen soft, extremities cool with palpable symmetric pulsesx4. pt also started on Lovenox as epimeric treatment for PE.  on the floor was hypotermic but less tachypneic post morphine.

## 2018-03-11 NOTE — ED PROVIDER NOTE - MEDICAL DECISION MAKING DETAILS
Impression: acute onset of dyspnea in setting of recently dx'd metastatic lung ca. Tachycardic and hypoxic on arrival. EKG initially showing sinus tach w/ rbbb, then noted to be in afib w/ rvr to 150's. Pt given dose of lopressor with improvement of tachycardia, though with transient drop in bp which improved with ivf. Bipap initially attempted however pt did not tolerate, preferring o2 via nrb. CXR showing r sided ca, ? associated infiltrate, though appears unchanged from prior cxr. Labs reviewed w/ findings of normal wbc,  hg/hct, mild dehydration; + elevated lft's, likely 2/2 liver mets. Trop 0.04 with neg bnp. Pt initially ordered for cta chest to r/o pe given h/o lung ca, new onset afib, lle swelling, however pt unable to lie supine for cta. Given multiple risk factors for thromboembolism, will tx presumptively for pe. Goals of care d/w pt and family (grandsons at bedside, one of which is health care proxy). Pt to be DNR/DNI, and comfort care only. Grandson agreeing to ivf, abx, anticoagulation as needed. Pressors to be w/held. Pt given morphine with improvement of air hunger and is currently on nrb. Will admit to regional medicine and palliative to be consulted as inpt.

## 2018-03-11 NOTE — H&P ADULT - PROBLEM SELECTOR PLAN 8
pt dehydrated on exam with evidence of protein-calorie malnutrition  - cont gentle hydration with IVF  - ok to feed if passes bedside swallow eval with puree diet and HOB elevation   - monitor kidney function and lytes and replete as needed borderline low Bp  - holding off bp meds

## 2018-03-11 NOTE — ED PROVIDER NOTE - OBJECTIVE STATEMENT
Pt is an 82yo f, h/o emphysema, ex-smoker, on home o2, tb as child, recently dx'd w/ metastatic lung ca, biba for sudden onset of severe sob approx 20min pta, no allev factors. No associated cp. Pt noted to be hypoxic to 76% on 3L nc, improved with nrb as per ems. No fever/ chills, cough, congestion. + lle swelling which began while she was at [facility] 2 wks ago, was dx'd w/ gout.

## 2018-03-11 NOTE — H&P ADULT - PROBLEM SELECTOR PLAN 7
- DVT PPx: pt on therapeutic AC  - aspiration risk, HOB elevation  - fall risk initiating end of life care, no MEWS, no LABS, DNR/DNI, however continuing with IV abx, VS q 8 hours for now, palliative consult in AM

## 2018-03-11 NOTE — ED PROVIDER NOTE - PHYSICAL EXAMINATION
VITAL SIGNS: I have reviewed nursing notes and confirm.  CONSTITUTIONAL: Thin, elderly f; in mod resp distress, tachypneic.   SKIN:  warm and dry, no acute rash.   HEAD:  normocephalic, atraumatic.  EYES: PERRL, EOM intact; conjunctiva and sclera clear.  ENT: No nasal discharge; airway clear.   NECK: Supple; non tender.  CARD: S1, S2 normal; no murmurs, gallops, or rubs. Tachycardic rate and reg rhythm.   RESP:  Decreased bs to r lung, no wheezes/ rales/ rhonchi.  ABD: Normal bowel sounds; soft; non-distended; non-tender; no guarding/ rebound.  EXT: + pitting edema of lle compared to rle, no calf tenderness/ cords. 2+ pulses b/l.   NEURO: Alert, oriented, grossly unremarkable  PSYCH: Cooperative, mood and affect appropriate.

## 2018-03-11 NOTE — H&P ADULT - PROBLEM SELECTOR PLAN 6
borderline low Bp  - holding off bp meds elevated lactate at 2.7, not responded to IVF  ddx: could be in the setting malignancy vs dyspneas vs liver dysfunction given mets  - disussion made for pt to be initiated for start of comfort care, no further lab draws, pt on IVFs

## 2018-03-11 NOTE — H&P ADULT - PROBLEM SELECTOR PLAN 1
most likely in the setting of PE, Well's score for PE is 7 considered high risk, pt unable to tolerate CTPE, vs pneumonia however less likely in the absence of cough fever, leukocytosis however RML infiltrate slightly worsen since previous admission  - Lovenox 60 mg daily, renally dosed for now   - will decide about length of Rx based on symptoms control/ prognosis and palliative recs  - continue zosyn will add vancomycin as well  - symptom control with dilaudid 0.5 mg q r PRN most likely in the setting of PE, Well's score for PE is 7 considered high risk, pt unable to tolerate CTPE, vs pneumonia however less likely in the absence of cough fever, leukocytosis however RML infiltrate slightly worsen since previous admission  - Lovenox 60 mg daily, renally dosed for now   - will decide about length of Rx based on symptoms control/ prognosis and palliative recs  - continue zosyn will add vancomycin as well  - symptom control with dilaudid 0.5 mg q r PRN  - pt currently on NRB will try minimal setting Bipap as tolerated to help with WOB

## 2018-03-11 NOTE — H&P ADULT - PROBLEM SELECTOR PLAN 3
no hx of a-fib, pt s/p 5 mg IV lopressor in ED, currently rate controlled  [id number] ~ 4 given current disease prognosis no concern for stroke prevention, however pt on AC torres empiric  Rx of PE. new dx of stage for lung CA, Non small cell favoring adenocarcinoma with liver and bone mets.  - palliative consult in am

## 2018-03-11 NOTE — ED PROVIDER NOTE - CARE PLAN
Principal Discharge DX:	Dyspnea, unspecified type  Secondary Diagnosis:	Atrial fibrillation, unspecified type  Secondary Diagnosis:	Primary malignant neoplasm of lung metastatic to other site, unspecified laterality

## 2018-03-11 NOTE — H&P ADULT - PROBLEM SELECTOR PLAN 5
elevated lactate at 2.7, not responded to IVF  ddx: could be in the setting malignancy vs dyspneas vs liver dysfunction given mets  - no need to trend lactate Cr elevated to 1.33 from baseline of 0.9 without AG, pt initially retaining urine and not able to void, most likely prerenal in the setting of poor PO intake.  - Padilla placement  - avoid nephrotoxic agents, strict I/Os  - continue with gentle hydration 50 cc/h  - repeat BMP in am, monitor lytes and replete as needed

## 2018-03-11 NOTE — ED PROVIDER NOTE - CRITICAL CARE PROVIDED
documentation/consult w/ pt's family directly relating to pts condition/conducted a detailed discussion of DNR status/interpretation of diagnostic studies/additional history taking/consultation with other physicians/direct patient care (not related to procedure)

## 2018-03-12 DIAGNOSIS — R52 PAIN, UNSPECIFIED: ICD-10-CM

## 2018-03-12 DIAGNOSIS — Z71.89 OTHER SPECIFIED COUNSELING: ICD-10-CM

## 2018-03-12 DIAGNOSIS — R11.0 NAUSEA: ICD-10-CM

## 2018-03-12 DIAGNOSIS — Z51.5 ENCOUNTER FOR PALLIATIVE CARE: ICD-10-CM

## 2018-03-12 DIAGNOSIS — C34.90 MALIGNANT NEOPLASM OF UNSPECIFIED PART OF UNSPECIFIED BRONCHUS OR LUNG: ICD-10-CM

## 2018-03-12 DIAGNOSIS — F41.9 ANXIETY DISORDER, UNSPECIFIED: ICD-10-CM

## 2018-03-12 DIAGNOSIS — Z78.9 OTHER SPECIFIED HEALTH STATUS: ICD-10-CM

## 2018-03-12 DIAGNOSIS — R06.02 SHORTNESS OF BREATH: ICD-10-CM

## 2018-03-12 DIAGNOSIS — Z66 DO NOT RESUSCITATE: ICD-10-CM

## 2018-03-12 LAB
ALBUMIN SERPL ELPH-MCNC: 3.5 G/DL — SIGNIFICANT CHANGE UP (ref 3.3–5)
ALP SERPL-CCNC: 365 U/L — HIGH (ref 40–120)
ALT FLD-CCNC: 283 U/L — HIGH (ref 10–45)
ANION GAP SERPL CALC-SCNC: 18 MMOL/L — HIGH (ref 5–17)
APTT BLD: 29.6 SEC — SIGNIFICANT CHANGE UP (ref 27.5–37.4)
AST SERPL-CCNC: 414 U/L — HIGH (ref 10–40)
BILIRUB SERPL-MCNC: 0.6 MG/DL — SIGNIFICANT CHANGE UP (ref 0.2–1.2)
BUN SERPL-MCNC: 34 MG/DL — HIGH (ref 7–23)
CALCIUM SERPL-MCNC: 9.1 MG/DL — SIGNIFICANT CHANGE UP (ref 8.4–10.5)
CHLORIDE SERPL-SCNC: 99 MMOL/L — SIGNIFICANT CHANGE UP (ref 96–108)
CO2 SERPL-SCNC: 18 MMOL/L — LOW (ref 22–31)
CREAT SERPL-MCNC: 1.68 MG/DL — HIGH (ref 0.5–1.3)
CULTURE RESULTS: NO GROWTH — SIGNIFICANT CHANGE UP
GLUCOSE SERPL-MCNC: 132 MG/DL — HIGH (ref 70–99)
HCT VFR BLD CALC: 37.7 % — SIGNIFICANT CHANGE UP (ref 34.5–45)
HGB BLD-MCNC: 12.2 G/DL — SIGNIFICANT CHANGE UP (ref 11.5–15.5)
INR BLD: 1.11 — SIGNIFICANT CHANGE UP (ref 0.88–1.16)
LYMPHOCYTES # BLD AUTO: 5 % — LOW (ref 13–44)
MAGNESIUM SERPL-MCNC: 1.7 MG/DL — SIGNIFICANT CHANGE UP (ref 1.6–2.6)
MCHC RBC-ENTMCNC: 28.9 PG — SIGNIFICANT CHANGE UP (ref 27–34)
MCHC RBC-ENTMCNC: 32.4 G/DL — SIGNIFICANT CHANGE UP (ref 32–36)
MCV RBC AUTO: 89.3 FL — SIGNIFICANT CHANGE UP (ref 80–100)
MONOCYTES NFR BLD AUTO: 7 % — SIGNIFICANT CHANGE UP (ref 2–14)
NEUTROPHILS NFR BLD AUTO: 78 % — HIGH (ref 43–77)
PLATELET # BLD AUTO: 303 K/UL — SIGNIFICANT CHANGE UP (ref 150–400)
POTASSIUM SERPL-MCNC: 4.9 MMOL/L — SIGNIFICANT CHANGE UP (ref 3.5–5.3)
POTASSIUM SERPL-SCNC: 4.9 MMOL/L — SIGNIFICANT CHANGE UP (ref 3.5–5.3)
PROT SERPL-MCNC: 7.5 G/DL — SIGNIFICANT CHANGE UP (ref 6–8.3)
PROTHROM AB SERPL-ACNC: 12.3 SEC — SIGNIFICANT CHANGE UP (ref 9.8–12.7)
RBC # BLD: 4.22 M/UL — SIGNIFICANT CHANGE UP (ref 3.8–5.2)
RBC # FLD: 15.5 % — SIGNIFICANT CHANGE UP (ref 10.3–16.9)
SODIUM SERPL-SCNC: 135 MMOL/L — SIGNIFICANT CHANGE UP (ref 135–145)
SPECIMEN SOURCE: SIGNIFICANT CHANGE UP
WBC # BLD: 11.9 K/UL — HIGH (ref 3.8–10.5)
WBC # FLD AUTO: 11.9 K/UL — HIGH (ref 3.8–10.5)

## 2018-03-12 PROCEDURE — 99223 1ST HOSP IP/OBS HIGH 75: CPT

## 2018-03-12 PROCEDURE — 99233 SBSQ HOSP IP/OBS HIGH 50: CPT

## 2018-03-12 RX ORDER — HYDROMORPHONE HYDROCHLORIDE 2 MG/ML
0.5 INJECTION INTRAMUSCULAR; INTRAVENOUS; SUBCUTANEOUS EVERY 4 HOURS
Qty: 0 | Refills: 0 | Status: DISCONTINUED | OUTPATIENT
Start: 2018-03-12 | End: 2018-03-13

## 2018-03-12 RX ORDER — HYDROMORPHONE HYDROCHLORIDE 2 MG/ML
0.5 INJECTION INTRAMUSCULAR; INTRAVENOUS; SUBCUTANEOUS
Qty: 0 | Refills: 0 | Status: DISCONTINUED | OUTPATIENT
Start: 2018-03-12 | End: 2018-03-13

## 2018-03-12 RX ORDER — DEXAMETHASONE 0.5 MG/5ML
4 ELIXIR ORAL EVERY 12 HOURS
Qty: 0 | Refills: 0 | Status: DISCONTINUED | OUTPATIENT
Start: 2018-03-12 | End: 2018-03-14

## 2018-03-12 RX ORDER — POLYETHYLENE GLYCOL 3350 17 G/17G
17 POWDER, FOR SOLUTION ORAL
Qty: 0 | Refills: 0 | Status: DISCONTINUED | OUTPATIENT
Start: 2018-03-12 | End: 2018-03-14

## 2018-03-12 RX ORDER — MAGNESIUM SULFATE 500 MG/ML
1 VIAL (ML) INJECTION ONCE
Qty: 0 | Refills: 0 | Status: COMPLETED | OUTPATIENT
Start: 2018-03-12 | End: 2018-03-12

## 2018-03-12 RX ORDER — PANTOPRAZOLE SODIUM 20 MG/1
40 TABLET, DELAYED RELEASE ORAL
Qty: 0 | Refills: 0 | Status: DISCONTINUED | OUTPATIENT
Start: 2018-03-12 | End: 2018-03-14

## 2018-03-12 RX ORDER — SODIUM CHLORIDE 9 MG/ML
1000 INJECTION INTRAMUSCULAR; INTRAVENOUS; SUBCUTANEOUS
Qty: 0 | Refills: 0 | Status: DISCONTINUED | OUTPATIENT
Start: 2018-03-12 | End: 2018-03-12

## 2018-03-12 RX ORDER — HYDROMORPHONE HYDROCHLORIDE 2 MG/ML
0.5 INJECTION INTRAMUSCULAR; INTRAVENOUS; SUBCUTANEOUS ONCE
Qty: 0 | Refills: 0 | Status: DISCONTINUED | OUTPATIENT
Start: 2018-03-12 | End: 2018-03-12

## 2018-03-12 RX ADMIN — HYDROMORPHONE HYDROCHLORIDE 0.5 MILLIGRAM(S): 2 INJECTION INTRAMUSCULAR; INTRAVENOUS; SUBCUTANEOUS at 00:50

## 2018-03-12 RX ADMIN — HYDROMORPHONE HYDROCHLORIDE 0.5 MILLIGRAM(S): 2 INJECTION INTRAMUSCULAR; INTRAVENOUS; SUBCUTANEOUS at 16:06

## 2018-03-12 RX ADMIN — PIPERACILLIN AND TAZOBACTAM 200 GRAM(S): 4; .5 INJECTION, POWDER, LYOPHILIZED, FOR SOLUTION INTRAVENOUS at 17:03

## 2018-03-12 RX ADMIN — HYDROMORPHONE HYDROCHLORIDE 0.5 MILLIGRAM(S): 2 INJECTION INTRAMUSCULAR; INTRAVENOUS; SUBCUTANEOUS at 04:30

## 2018-03-12 RX ADMIN — PIPERACILLIN AND TAZOBACTAM 200 GRAM(S): 4; .5 INJECTION, POWDER, LYOPHILIZED, FOR SOLUTION INTRAVENOUS at 23:46

## 2018-03-12 RX ADMIN — HYDROMORPHONE HYDROCHLORIDE 0.5 MILLIGRAM(S): 2 INJECTION INTRAMUSCULAR; INTRAVENOUS; SUBCUTANEOUS at 12:52

## 2018-03-12 RX ADMIN — Medication 3 MILLILITER(S): at 04:30

## 2018-03-12 RX ADMIN — PIPERACILLIN AND TAZOBACTAM 200 GRAM(S): 4; .5 INJECTION, POWDER, LYOPHILIZED, FOR SOLUTION INTRAVENOUS at 12:05

## 2018-03-12 RX ADMIN — HYDROMORPHONE HYDROCHLORIDE 0.5 MILLIGRAM(S): 2 INJECTION INTRAMUSCULAR; INTRAVENOUS; SUBCUTANEOUS at 05:00

## 2018-03-12 RX ADMIN — SODIUM CHLORIDE 100 MILLILITER(S): 9 INJECTION INTRAMUSCULAR; INTRAVENOUS; SUBCUTANEOUS at 01:00

## 2018-03-12 RX ADMIN — HYDROMORPHONE HYDROCHLORIDE 0.5 MILLIGRAM(S): 2 INJECTION INTRAMUSCULAR; INTRAVENOUS; SUBCUTANEOUS at 17:04

## 2018-03-12 RX ADMIN — Medication 0.5 MILLIGRAM(S): at 18:54

## 2018-03-12 RX ADMIN — SODIUM CHLORIDE 175 MILLILITER(S): 9 INJECTION INTRAMUSCULAR; INTRAVENOUS; SUBCUTANEOUS at 04:30

## 2018-03-12 RX ADMIN — Medication 100 GRAM(S): at 16:27

## 2018-03-12 RX ADMIN — PIPERACILLIN AND TAZOBACTAM 200 GRAM(S): 4; .5 INJECTION, POWDER, LYOPHILIZED, FOR SOLUTION INTRAVENOUS at 00:47

## 2018-03-12 RX ADMIN — POLYETHYLENE GLYCOL 3350 17 GRAM(S): 17 POWDER, FOR SOLUTION ORAL at 18:57

## 2018-03-12 RX ADMIN — PIPERACILLIN AND TAZOBACTAM 200 GRAM(S): 4; .5 INJECTION, POWDER, LYOPHILIZED, FOR SOLUTION INTRAVENOUS at 06:00

## 2018-03-12 RX ADMIN — Medication 250 MILLIGRAM(S): at 18:57

## 2018-03-12 RX ADMIN — ENOXAPARIN SODIUM 60 MILLIGRAM(S): 100 INJECTION SUBCUTANEOUS at 17:03

## 2018-03-12 RX ADMIN — HYDROMORPHONE HYDROCHLORIDE 0.5 MILLIGRAM(S): 2 INJECTION INTRAMUSCULAR; INTRAVENOUS; SUBCUTANEOUS at 14:17

## 2018-03-12 NOTE — PROGRESS NOTE ADULT - PROBLEM SELECTOR PLAN 3
new dx of stage for lung CA, Non small cell favoring adenocarcinoma with liver and bone mets.  - palliative consult in am new dx of stage for lung CA, Non small cell favoring adenocarcinoma with liver and bone mets.  - f/u palliative consult  - holding heme/onc intervention for now

## 2018-03-12 NOTE — CONSULT NOTE ADULT - PROBLEM SELECTOR RECOMMENDATION 5
Cr elevated to 1.33 from baseline of 0.9 without AG, pt initially retaining urine and -most likely prerenal in the setting of poor PO intake.  - Padilla in situ  - avoid nephrotoxic agents, strict I/Os  - trend BMP, monitor lytes and replete as needed.

## 2018-03-12 NOTE — CONSULT NOTE ADULT - PROBLEM SELECTOR RECOMMENDATION 6
as per pain assessment   consider Dilaudid 0.5 mg IV q 4 hr ATC,  and q 1 hr prn If using > 2 doses b/T for either pain or SOB, consider continuous infusion @ 0.2 mg hr  Avoid morphine in setting of EDWARD  consider benefit vs burden of use of Decadron 4 mg iv(may help nausea, appetite, lethargy) for bony mets, may consider adding Aleve which has helped in past  add PPI  add senna/colace to avoid opioid

## 2018-03-12 NOTE — PROGRESS NOTE ADULT - PROBLEM SELECTOR PLAN 7
initiating end of life care, no MEWS, no LABS, DNR/DNI, however continuing with IV abx, VS q 8 hours for now, palliative consult in AM initiating end of life care, no MEWS, DNR/DNI, however continuing with IV abx, VS q 8 hours for now  -f/u palliative recs

## 2018-03-12 NOTE — PROGRESS NOTE ADULT - SUBJECTIVE AND OBJECTIVE BOX
INTERVAL HPI/OVERNIGHT EVENTS:    SUBJECTIVE: Patient seen and examined at bedside.    OBJECTIVE:    VITAL SIGNS:  ICU Vital Signs Last 24 Hrs  T(C): 36.1 (12 Mar 2018 04:30), Max: 36.8 (11 Mar 2018 13:17)  T(F): 97 (12 Mar 2018 04:30), Max: 98.2 (11 Mar 2018 13:17)  HR: 98 (12 Mar 2018 06:50) (80 - 138)  BP: 106/68 (12 Mar 2018 04:30) (86/58 - 128/67)  BP(mean): --  ABP: --  ABP(mean): --  RR: 24 (12 Mar 2018 06:50) (16 - 32)  SpO2: 98% (12 Mar 2018 06:50) (86% - 100%)        03-11 @ 07:01  -  03-12 @ 07:00  --------------------------------------------------------  IN: 1175 mL / OUT: 110 mL / NET: 1065 mL      CAPILLARY BLOOD GLUCOSE          PHYSICAL EXAM:    General: NAD  HEENT: NC/AT; PERRL, clear conjunctiva  Neck: supple  Respiratory: CTA b/l  Cardiovascular: +S1/S2; RRR  Abdomen: soft, NT/ND; +BS x4  Extremities: WWP, 2+ peripheral pulses b/l; no LE edema  Skin: normal color and turgor; no rash  Neurological:     MEDICATIONS:  MEDICATIONS  (STANDING):  enoxaparin Injectable 60 milliGRAM(s) SubCutaneous every 24 hours  piperacillin/tazobactam IVPB. 2.25 Gram(s) IV Intermittent every 6 hours  vancomycin  IVPB 750 milliGRAM(s) IV Intermittent every 24 hours  vancomycin  IVPB        MEDICATIONS  (PRN):  ALBUTerol/ipratropium for Nebulization 3 milliLiter(s) Nebulizer every 6 hours PRN SOB/wheezing  HYDROmorphone  Injectable 0.5 milliGRAM(s) IV Push every 4 hours PRN SOB      ALLERGIES:  Allergies    aspirin (Unknown)    Intolerances        LABS:                        12.2   11.9  )-----------( 303      ( 12 Mar 2018 11:03 )             37.7     03-12    135  |  99  |  34<H>  ----------------------------<  132<H>  4.9   |  18<L>  |  1.68<H>    Ca    9.1      12 Mar 2018 11:03  Mg     1.7     03-12    TPro  7.5  /  Alb  3.5  /  TBili  0.6  /  DBili  x   /  AST  414<H>  /  ALT  283<H>  /  AlkPhos  365<H>  03-12    PT/INR - ( 12 Mar 2018 11:03 )   PT: 12.3 sec;   INR: 1.11          PTT - ( 12 Mar 2018 11:03 )  PTT:29.6 sec  Urinalysis Basic - ( 11 Mar 2018 17:20 )    Color: x / Appearance: x / SG: x / pH: x  Gluc: x / Ketone: x  / Bili: x / Urobili: x   Blood: x / Protein: x / Nitrite: x   Leuk Esterase: x / RBC: < 5 /HPF / WBC 5-10 /HPF   Sq Epi: x / Non Sq Epi: 0-5 /HPF / Bacteria: Present /HPF        RADIOLOGY & ADDITIONAL TESTS: Reviewed. INTERVAL HPI/OVERNIGHT EVENTS: Admitted to [facility]    SUBJECTIVE: Patient seen and examined at bedside. Patient is less lethargic compared to yesterday. AAOx3.  Currently on HFNC. Patient is tachypnic and has intermittent cough. Denies fever, chills, sputum production, n/v or diarrhea. Last BM yesterday.     OBJECTIVE:    VITAL SIGNS:  ICU Vital Signs Last 24 Hrs  T(C): 36.1 (12 Mar 2018 04:30), Max: 36.8 (11 Mar 2018 13:17)  T(F): 97 (12 Mar 2018 04:30), Max: 98.2 (11 Mar 2018 13:17)  HR: 98 (12 Mar 2018 06:50) (80 - 138)  BP: 106/68 (12 Mar 2018 04:30) (86/58 - 128/67)  BP(mean): --  ABP: --  ABP(mean): --  RR: 24 (12 Mar 2018 06:50) (16 - 32)  SpO2: 98% (12 Mar 2018 06:50) (86% - 100%)        03-11 @ 07:01  -  03-12 @ 07:00  --------------------------------------------------------  IN: 1175 mL / OUT: 110 mL / NET: 1065 mL      CAPILLARY BLOOD GLUCOSE          PHYSICAL EXAM:  General: tachypnic  HEENT: NC/AT; PERRL, clear conjunctiva  Neck: supple  Respiratory: crackles b/l  Cardiovascular: +S1/S2; RRR  Abdomen: soft, NT/ND; +BS x4  Extremities: WWP, 2+ peripheral pulses b/l; 2+ LE edema b/l  Skin: normal color and turgor; no rash  Neurological: AAOx3. No focal deficits.     MEDICATIONS:  MEDICATIONS  (STANDING):  enoxaparin Injectable 60 milliGRAM(s) SubCutaneous every 24 hours  piperacillin/tazobactam IVPB. 2.25 Gram(s) IV Intermittent every 6 hours  vancomycin  IVPB 750 milliGRAM(s) IV Intermittent every 24 hours  vancomycin  IVPB        MEDICATIONS  (PRN):  ALBUTerol/ipratropium for Nebulization 3 milliLiter(s) Nebulizer every 6 hours PRN SOB/wheezing  HYDROmorphone  Injectable 0.5 milliGRAM(s) IV Push every 4 hours PRN SOB      ALLERGIES:  Allergies    aspirin (Unknown)    Intolerances        LABS:                        12.2   11.9  )-----------( 303      ( 12 Mar 2018 11:03 )             37.7     03-12    135  |  99  |  34<H>  ----------------------------<  132<H>  4.9   |  18<L>  |  1.68<H>    Ca    9.1      12 Mar 2018 11:03  Mg     1.7     03-12    TPro  7.5  /  Alb  3.5  /  TBili  0.6  /  DBili  x   /  AST  414<H>  /  ALT  283<H>  /  AlkPhos  365<H>  03-12    PT/INR - ( 12 Mar 2018 11:03 )   PT: 12.3 sec;   INR: 1.11          PTT - ( 12 Mar 2018 11:03 )  PTT:29.6 sec  Urinalysis Basic - ( 11 Mar 2018 17:20 )    Color: x / Appearance: x / SG: x / pH: x  Gluc: x / Ketone: x  / Bili: x / Urobili: x   Blood: x / Protein: x / Nitrite: x   Leuk Esterase: x / RBC: < 5 /HPF / WBC 5-10 /HPF   Sq Epi: x / Non Sq Epi: 0-5 /HPF / Bacteria: Present /HPF        RADIOLOGY & ADDITIONAL TESTS: Reviewed.

## 2018-03-12 NOTE — CONSULT NOTE ADULT - PROBLEM SELECTOR RECOMMENDATION 3
without vomiting, loss of appetite with the smell of food making her nauseous   consider Ativan 0.5 mg IV q 12 hrs.

## 2018-03-12 NOTE — PROGRESS NOTE ADULT - ASSESSMENT
A/P    84 yo AAF presenting to ED for new onset severe SOB, PMH notable for heavy smoking Hx, Hx of pulmonary nodule and emphysema, new diagnosis of NSCL CA, in Feb 2018.  Impression:  presentation highly concerning for pulmonary emboli, given new onset SOB, in the setting of active cancer, hypoxia correcting with supplemental O2 vs Pneumonia CXR slightly worsened since previous admission  however pt denies any cough or febrile illness, no leukocytosis under labs vs cardiac etiologies however less likely as pt had a SHELLI recently with normal EF and no sig valvular disease, however with mod to severe pulm HTN vs worsening of pulmonary HTN    pt code status discussed with Jad, HCP and plan is for comfort care measures, however ok for IVF trial, ABx and anticoagulation. 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  Impression:  presentation highly concerning for pulmonary emboli, given new onset SOB, in the setting of active cancer, hypoxia correcting with supplemental O2 vs Pneumonia CXR slightly worsened since previous admission  however pt denies any cough or febrile illness, no leukocytosis under labs vs cardiac etiologies however less likely as pt had a SHELLI recently with normal EF and no sig valvular disease, however with mod to severe pulm HTN vs worsening of pulmonary HTN    pt code status discussed with Jad, HCP and plan is for comfort care measures, however ok for IVF trial, ABx and anticoagulation. 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

## 2018-03-12 NOTE — PROGRESS NOTE ADULT - PROBLEM SELECTOR PLAN 6
elevated lactate at 2.7, not responded to IVF  ddx: could be in the setting malignancy vs dyspneas vs liver dysfunction given mets  - disussion made for pt to be initiated for start of comfort care, no further lab draws, pt on IVFs elevated lactate at 2.7, not responded to IVF  ddx: malignancy vs dyspnea vs liver dysfunction given mets  - disussion made for pt to be initiated for start of comfort care

## 2018-03-12 NOTE — CONSULT NOTE ADULT - PROBLEM SELECTOR RECOMMENDATION 9
new dx of stage for lung CA, Non small cell favoring adenocarcinoma with liver and bone mets.  Had follow up with hem onc following her recent D/C  Presently with high symptom burden and poor functional status ECOG 3)  Pt has not R/O possible tx in future but agree to focus on getting stronger before making any decisions will need further clarification when pt less acute

## 2018-03-12 NOTE — CONSULT NOTE ADULT - PROBLEM SELECTOR RECOMMENDATION 8
Support provided to patient and family. Patient to have access to supportive services during rest of hospital stay as the patient/family deemed necessary ie. Chaplaincy, Massage therapy, Music therapy, Patient and family supportive services, Palliative SW, etc.   PSSA screening completed  the patient would benefit from: music therapy, patient and family support

## 2018-03-12 NOTE — PROGRESS NOTE ADULT - PROBLEM SELECTOR PLAN 5
Cr elevated to 1.33 from baseline of 0.9 without AG, pt initially retaining urine and not able to void, most likely prerenal in the setting of poor PO intake.  - Padilla placement  - avoid nephrotoxic agents, strict I/Os  - continue with gentle hydration 50 cc/h  - repeat BMP in am, monitor lytes and replete as needed Cr elevated to 1.33 from baseline of 0.9 without AG, pt initially retaining urine and -most likely prerenal in the setting of poor PO intake.  - Padilla placement  - avoid nephrotoxic agents, strict I/Os  - trend BMP, monitor lytes and replete as needed

## 2018-03-12 NOTE — CONSULT NOTE ADULT - PROBLEM SELECTOR RECOMMENDATION 4
appears in SOB/anxiety/pain cycle. likely multifactorial  dosing of Ativan and Dilaudid may help to break this cycle

## 2018-03-12 NOTE — CONSULT NOTE ADULT - PROBLEM SELECTOR RECOMMENDATION 7
met with pt and her 3 grandsons. Discussed dx and prognosis. All have good understanding and realistic She is very firm in repeating her desire to not receive CPR or be necessitated. She is focused on relieving her acute symptoms before making any further decisions to have further conversation with Hem Onc to discuss possible tx options. At present time she is to receive abt if needed, BW and radiology as needed. She is not comfort care measures. Will further  clarify  wishes stated on MOLST over next days.

## 2018-03-12 NOTE — CONSULT NOTE ADULT - PROBLEM SELECTOR RECOMMENDATION 2
received Dilaudid 0.5 mg x 4 doses over 25 hrs   Dyspnea escalates quickly  suggest Dilaudid 0.5 mg IV q 4 hr ATC wih 0.5 mg IV q 1 hr prn until analgesic needs known  med nebs, O2 as needed

## 2018-03-12 NOTE — PROGRESS NOTE ADULT - PROBLEM SELECTOR PLAN 9
- DVT PPx: pt on therapeutic AC  - aspiration risk, HOB elevation  - fall risk DVT: Lovenox  GI: no indication  Code: DNR/DNI  Dispo: [facility]

## 2018-03-12 NOTE — PROGRESS NOTE ADULT - PROBLEM SELECTOR PLAN 4
no hx of a-fib, pt s/p 5 mg IV lopressor in ED, currently rate controlled  [id number] ~ 4 given current disease prognosis no concern for stroke prevention, however pt on AC torres empiric  Rx of PE. -no hx of a-fib, pt s/p 5 mg IV lopressor in ED, currently rate controlled  [id number] ~ 4 given current disease prognosis no concern for stroke prevention, however pt on AC torres empiric  Rx of PE

## 2018-03-12 NOTE — PROGRESS NOTE ADULT - PROBLEM SELECTOR PLAN 1
most likely in the setting of PE, Well's score for PE is 7 considered high risk, pt unable to tolerate CTPE, vs pneumonia however less likely in the absence of cough fever, leukocytosis however RML infiltrate slightly worsen since previous admission  - Lovenox 60 mg daily, renally dosed for now   - will decide about length of Rx based on symptoms control/ prognosis and palliative recs  - continue zosyn will add vancomycin as well  - symptom control with dilaudid 0.5 mg q r PRN  - pt currently on NRB will try minimal setting Bipap as tolerated to help with WOB -most likely in the setting of PE, Well's score for PE is 7 considered high risk, pt unable to tolerate CTPE, vs pneumonia however less likely in the absence of cough fever, leukocytosis however RML infiltrate slightly worsen since previous admission  - Lovenox 60 mg daily, renally dosed for now   - will decide about length of Rx based on symptoms control/ prognosis and palliative recs  - continue zosyn and vancomycin as well  - symptom control with dilaudid 0.5 mg q r PRN  - pt currently on NRB will try minimal setting Bipap as tolerated to help with WOB

## 2018-03-12 NOTE — PROGRESS NOTE ADULT - PROBLEM SELECTOR PLAN 10
pt dehydrated on exam with evidence of protein-calorie malnutrition  - cont gentle hydration with IVF  - ok to feed if passes bedside swallow eval with puree diet and HOB elevation   - monitor kidney function and lytes and replete as needed F: no IVF  E: replete lytes as needed  N: Puree diet

## 2018-03-13 DIAGNOSIS — R41.0 DISORIENTATION, UNSPECIFIED: ICD-10-CM

## 2018-03-13 DIAGNOSIS — I49.8 OTHER SPECIFIED CARDIAC ARRHYTHMIAS: ICD-10-CM

## 2018-03-13 DIAGNOSIS — Z51.5 ENCOUNTER FOR PALLIATIVE CARE: ICD-10-CM

## 2018-03-13 LAB
ALBUMIN SERPL ELPH-MCNC: 3.4 G/DL — SIGNIFICANT CHANGE UP (ref 3.3–5)
ALP SERPL-CCNC: 309 U/L — HIGH (ref 40–120)
ALT FLD-CCNC: 234 U/L — HIGH (ref 10–45)
ANION GAP SERPL CALC-SCNC: 14 MMOL/L — SIGNIFICANT CHANGE UP (ref 5–17)
AST SERPL-CCNC: 180 U/L — HIGH (ref 10–40)
BILIRUB SERPL-MCNC: 0.4 MG/DL — SIGNIFICANT CHANGE UP (ref 0.2–1.2)
BUN SERPL-MCNC: 37 MG/DL — HIGH (ref 7–23)
CALCIUM SERPL-MCNC: 9.2 MG/DL — SIGNIFICANT CHANGE UP (ref 8.4–10.5)
CHLORIDE SERPL-SCNC: 104 MMOL/L — SIGNIFICANT CHANGE UP (ref 96–108)
CO2 SERPL-SCNC: 21 MMOL/L — LOW (ref 22–31)
CREAT SERPL-MCNC: 1.8 MG/DL — HIGH (ref 0.5–1.3)
GLUCOSE SERPL-MCNC: 110 MG/DL — HIGH (ref 70–99)
HCT VFR BLD CALC: 37.2 % — SIGNIFICANT CHANGE UP (ref 34.5–45)
HGB BLD-MCNC: 12 G/DL — SIGNIFICANT CHANGE UP (ref 11.5–15.5)
MAGNESIUM SERPL-MCNC: 2.3 MG/DL — SIGNIFICANT CHANGE UP (ref 1.6–2.6)
MCHC RBC-ENTMCNC: 28.3 PG — SIGNIFICANT CHANGE UP (ref 27–34)
MCHC RBC-ENTMCNC: 32.3 G/DL — SIGNIFICANT CHANGE UP (ref 32–36)
MCV RBC AUTO: 87.7 FL — SIGNIFICANT CHANGE UP (ref 80–100)
PLATELET # BLD AUTO: 315 K/UL — SIGNIFICANT CHANGE UP (ref 150–400)
POTASSIUM SERPL-MCNC: 5.4 MMOL/L — HIGH (ref 3.5–5.3)
POTASSIUM SERPL-SCNC: 5.4 MMOL/L — HIGH (ref 3.5–5.3)
PROT SERPL-MCNC: 7.1 G/DL — SIGNIFICANT CHANGE UP (ref 6–8.3)
RBC # BLD: 4.24 M/UL — SIGNIFICANT CHANGE UP (ref 3.8–5.2)
RBC # FLD: 16.3 % — SIGNIFICANT CHANGE UP (ref 10.3–16.9)
SODIUM SERPL-SCNC: 139 MMOL/L — SIGNIFICANT CHANGE UP (ref 135–145)
WBC # BLD: 17.7 K/UL — HIGH (ref 3.8–10.5)
WBC # FLD AUTO: 17.7 K/UL — HIGH (ref 3.8–10.5)

## 2018-03-13 PROCEDURE — 99233 SBSQ HOSP IP/OBS HIGH 50: CPT

## 2018-03-13 RX ORDER — HALOPERIDOL DECANOATE 100 MG/ML
0.5 INJECTION INTRAMUSCULAR EVERY 6 HOURS
Qty: 0 | Refills: 0 | Status: DISCONTINUED | OUTPATIENT
Start: 2018-03-13 | End: 2018-03-14

## 2018-03-13 RX ORDER — HYDROMORPHONE HYDROCHLORIDE 2 MG/ML
0.2 INJECTION INTRAMUSCULAR; INTRAVENOUS; SUBCUTANEOUS
Qty: 10 | Refills: 0 | Status: DISCONTINUED | OUTPATIENT
Start: 2018-03-13 | End: 2018-03-13

## 2018-03-13 RX ORDER — HYDROMORPHONE HYDROCHLORIDE 2 MG/ML
0.2 INJECTION INTRAMUSCULAR; INTRAVENOUS; SUBCUTANEOUS
Qty: 10 | Refills: 0 | Status: DISCONTINUED | OUTPATIENT
Start: 2018-03-13 | End: 2018-03-14

## 2018-03-13 RX ORDER — HYDROMORPHONE HYDROCHLORIDE 2 MG/ML
0.5 INJECTION INTRAMUSCULAR; INTRAVENOUS; SUBCUTANEOUS
Qty: 0 | Refills: 0 | Status: DISCONTINUED | OUTPATIENT
Start: 2018-03-13 | End: 2018-03-14

## 2018-03-13 RX ADMIN — Medication 0.5 MILLIGRAM(S): at 13:38

## 2018-03-13 RX ADMIN — HYDROMORPHONE HYDROCHLORIDE 0.5 MILLIGRAM(S): 2 INJECTION INTRAMUSCULAR; INTRAVENOUS; SUBCUTANEOUS at 15:45

## 2018-03-13 RX ADMIN — HYDROMORPHONE HYDROCHLORIDE 0.5 MILLIGRAM(S): 2 INJECTION INTRAMUSCULAR; INTRAVENOUS; SUBCUTANEOUS at 13:08

## 2018-03-13 RX ADMIN — HYDROMORPHONE HYDROCHLORIDE 0.5 MILLIGRAM(S): 2 INJECTION INTRAMUSCULAR; INTRAVENOUS; SUBCUTANEOUS at 06:50

## 2018-03-13 RX ADMIN — HYDROMORPHONE HYDROCHLORIDE 1 MG/HR: 2 INJECTION INTRAMUSCULAR; INTRAVENOUS; SUBCUTANEOUS at 16:42

## 2018-03-13 RX ADMIN — Medication 0.5 MILLIGRAM(S): at 23:19

## 2018-03-13 RX ADMIN — PIPERACILLIN AND TAZOBACTAM 200 GRAM(S): 4; .5 INJECTION, POWDER, LYOPHILIZED, FOR SOLUTION INTRAVENOUS at 06:19

## 2018-03-13 RX ADMIN — HYDROMORPHONE HYDROCHLORIDE 0.5 MILLIGRAM(S): 2 INJECTION INTRAMUSCULAR; INTRAVENOUS; SUBCUTANEOUS at 16:18

## 2018-03-13 RX ADMIN — HYDROMORPHONE HYDROCHLORIDE 0.5 MILLIGRAM(S): 2 INJECTION INTRAMUSCULAR; INTRAVENOUS; SUBCUTANEOUS at 13:28

## 2018-03-13 RX ADMIN — HALOPERIDOL DECANOATE 0.5 MILLIGRAM(S): 100 INJECTION INTRAMUSCULAR at 12:46

## 2018-03-13 RX ADMIN — HYDROMORPHONE HYDROCHLORIDE 0.5 MILLIGRAM(S): 2 INJECTION INTRAMUSCULAR; INTRAVENOUS; SUBCUTANEOUS at 01:44

## 2018-03-13 RX ADMIN — HYDROMORPHONE HYDROCHLORIDE 0.5 MILLIGRAM(S): 2 INJECTION INTRAMUSCULAR; INTRAVENOUS; SUBCUTANEOUS at 15:48

## 2018-03-13 RX ADMIN — HYDROMORPHONE HYDROCHLORIDE 0.5 MILLIGRAM(S): 2 INJECTION INTRAMUSCULAR; INTRAVENOUS; SUBCUTANEOUS at 11:26

## 2018-03-13 RX ADMIN — HYDROMORPHONE HYDROCHLORIDE 0.2 MG/HR: 2 INJECTION INTRAMUSCULAR; INTRAVENOUS; SUBCUTANEOUS at 17:08

## 2018-03-13 RX ADMIN — HYDROMORPHONE HYDROCHLORIDE 0.5 MILLIGRAM(S): 2 INJECTION INTRAMUSCULAR; INTRAVENOUS; SUBCUTANEOUS at 04:23

## 2018-03-13 RX ADMIN — HYDROMORPHONE HYDROCHLORIDE 0.5 MILLIGRAM(S): 2 INJECTION INTRAMUSCULAR; INTRAVENOUS; SUBCUTANEOUS at 11:00

## 2018-03-13 RX ADMIN — HYDROMORPHONE HYDROCHLORIDE 0.5 MILLIGRAM(S): 2 INJECTION INTRAMUSCULAR; INTRAVENOUS; SUBCUTANEOUS at 16:28

## 2018-03-13 RX ADMIN — HYDROMORPHONE HYDROCHLORIDE 0.5 MILLIGRAM(S): 2 INJECTION INTRAMUSCULAR; INTRAVENOUS; SUBCUTANEOUS at 06:18

## 2018-03-13 NOTE — PROGRESS NOTE ADULT - SUBJECTIVE AND OBJECTIVE BOX
F/U visit to assess patient for comfort and to offer support to family. Pt continues with agonal breathing patterns and short but more frequent periods of apnea. Pt son and all five of her grandchildren are bedside. Discussed dying process, changes in breathing patterns, decreased alertness, changes in color and body temperature. Encouraged each to spend individual time alone with patient as shelikely  hears what they are saying. This will help in their initial grieving process. Encouraged to initite  plans. they state patient has already taken care of them. PM team to provide ongoing support as the day progresses  Prolonged time: 12n-12:25 P F/U visit to assess patient for comfort and to offer support to family. Pt continues with agonal breathing patterns and short but more frequent periods of apnea. Pt son and all five of her grandchildren are bedside. Discussed dying process, changes in breathing patterns, decreased alertness, changes in color and body temperature. Encouraged each to spend individual time alone with patient as shelikely  hears what they are saying. This will help in their initial grieving process. Encouraged to initiate  plans. they state patient has already taken care of them. PM team to provide ongoing support as the day progresses  Prolonged time: 12n-12:25 P

## 2018-03-13 NOTE — PROGRESS NOTE ADULT - SUBJECTIVE AND OBJECTIVE BOX
INTERVAL HPI/OVERNIGHT EVENTS:     SUBJECTIVE: Patient seen and examined at bedside.    OBJECTIVE:    VITAL SIGNS:  ICU Vital Signs Last 24 Hrs  T(C): 36.8 (13 Mar 2018 05:48), Max: 36.8 (13 Mar 2018 05:48)  T(F): 98.2 (13 Mar 2018 05:48), Max: 98.2 (13 Mar 2018 05:48)  HR: 103 (13 Mar 2018 05:48) (99 - 103)  BP: 115/60 (13 Mar 2018 05:48) (115/60 - 123/71)  BP(mean): --  ABP: --  ABP(mean): --  RR: 18 (13 Mar 2018 05:48) (18 - 20)  SpO2: 98% (13 Mar 2018 05:48) (97% - 100%)        03-12 @ 07:01  -  03-13 @ 07:00  --------------------------------------------------------  IN: 200 mL / OUT: 1050 mL / NET: -850 mL      CAPILLARY BLOOD GLUCOSE          PHYSICAL EXAM:    General: NAD  HEENT: NC/AT  Neck: supple  Respiratory: crackles b/l  Cardiovascular: +S1/S2; RRR  Abdomen: soft, NT/ND; +BS x4  Extremities: WWP, 2+ peripheral pulses b/l; 2+ LE edema b/l  Skin: normal color and turgor; no rash  Neurological: lethargic    MEDICATIONS:  MEDICATIONS  (STANDING):  dexamethasone     Tablet 4 milliGRAM(s) Oral every 12 hours  HYDROmorphone  Injectable 0.5 milliGRAM(s) IV Push every 4 hours  LORazepam   Injectable 0.5 milliGRAM(s) IV Push every 12 hours  pantoprazole    Tablet 40 milliGRAM(s) Oral before breakfast  polyethylene glycol 3350 17 Gram(s) Oral two times a day    MEDICATIONS  (PRN):  ALBUTerol/ipratropium for Nebulization 3 milliLiter(s) Nebulizer every 6 hours PRN SOB/wheezing  haloperidol    Injectable 0.5 milliGRAM(s) IV Push every 6 hours PRN Delirium  HYDROmorphone  Injectable 0.5 milliGRAM(s) IV Push every 1 hour PRN Severe Pain (7 - 10)  LORazepam   Injectable 0.5 milliGRAM(s) IV Push every 4 hours PRN Anxiety      ALLERGIES:  Allergies    aspirin (Unknown)    Intolerances        LABS:                        12.0   17.7  )-----------( 315      ( 13 Mar 2018 06:35 )             37.2     03-13    139  |  104  |  37<H>  ----------------------------<  110<H>  5.4<H>   |  21<L>  |  1.80<H>    Ca    9.2      13 Mar 2018 06:35  Mg     2.3     03-13    TPro  7.1  /  Alb  3.4  /  TBili  0.4  /  DBili  x   /  AST  180<H>  /  ALT  234<H>  /  AlkPhos  309<H>  03-13    PT/INR - ( 12 Mar 2018 11:03 )   PT: 12.3 sec;   INR: 1.11          PTT - ( 12 Mar 2018 11:03 )  PTT:29.6 sec  Urinalysis Basic - ( 11 Mar 2018 17:20 )    Color: x / Appearance: x / SG: x / pH: x  Gluc: x / Ketone: x  / Bili: x / Urobili: x   Blood: x / Protein: x / Nitrite: x   Leuk Esterase: x / RBC: < 5 /HPF / WBC 5-10 /HPF   Sq Epi: x / Non Sq Epi: 0-5 /HPF / Bacteria: Present /HPF        RADIOLOGY & ADDITIONAL TESTS: Reviewed. INTERVAL HPI/OVERNIGHT EVENTS:     SUBJECTIVE: Patient seen and examined at bedside. Patient appeared to have Kussmaul breathing with pauses. Alert, non-verbal. Family made aware, currently comfort care.     OBJECTIVE:    VITAL SIGNS:  ICU Vital Signs Last 24 Hrs  T(C): 36.8 (13 Mar 2018 05:48), Max: 36.8 (13 Mar 2018 05:48)  T(F): 98.2 (13 Mar 2018 05:48), Max: 98.2 (13 Mar 2018 05:48)  HR: 103 (13 Mar 2018 05:48) (99 - 103)  BP: 115/60 (13 Mar 2018 05:48) (115/60 - 123/71)  BP(mean): --  ABP: --  ABP(mean): --  RR: 18 (13 Mar 2018 05:48) (18 - 20)  SpO2: 98% (13 Mar 2018 05:48) (97% - 100%)        03-12 @ 07:01  -  03-13 @ 07:00  --------------------------------------------------------  IN: 200 mL / OUT: 1050 mL / NET: -850 mL      CAPILLARY BLOOD GLUCOSE          PHYSICAL EXAM:    General: NAD  HEENT: NC/AT  Neck: supple  Respiratory: crackles b/l  Cardiovascular: +systolic murmur; RRR  Abdomen: soft, NT/ND; +BS x4  Extremities: WWP, 2+ peripheral pulses b/l; 2+ LE edema b/l  Skin: normal color and turgor; no rash  Neurological: lethargic    MEDICATIONS:  MEDICATIONS  (STANDING):  dexamethasone     Tablet 4 milliGRAM(s) Oral every 12 hours  HYDROmorphone  Injectable 0.5 milliGRAM(s) IV Push every 4 hours  LORazepam   Injectable 0.5 milliGRAM(s) IV Push every 12 hours  pantoprazole    Tablet 40 milliGRAM(s) Oral before breakfast  polyethylene glycol 3350 17 Gram(s) Oral two times a day    MEDICATIONS  (PRN):  ALBUTerol/ipratropium for Nebulization 3 milliLiter(s) Nebulizer every 6 hours PRN SOB/wheezing  haloperidol    Injectable 0.5 milliGRAM(s) IV Push every 6 hours PRN Delirium  HYDROmorphone  Injectable 0.5 milliGRAM(s) IV Push every 1 hour PRN Severe Pain (7 - 10)  LORazepam   Injectable 0.5 milliGRAM(s) IV Push every 4 hours PRN Anxiety      ALLERGIES:  Allergies    aspirin (Unknown)    Intolerances        LABS:                        12.0   17.7  )-----------( 315      ( 13 Mar 2018 06:35 )             37.2     03-13    139  |  104  |  37<H>  ----------------------------<  110<H>  5.4<H>   |  21<L>  |  1.80<H>    Ca    9.2      13 Mar 2018 06:35  Mg     2.3     03-13    TPro  7.1  /  Alb  3.4  /  TBili  0.4  /  DBili  x   /  AST  180<H>  /  ALT  234<H>  /  AlkPhos  309<H>  03-13    PT/INR - ( 12 Mar 2018 11:03 )   PT: 12.3 sec;   INR: 1.11          PTT - ( 12 Mar 2018 11:03 )  PTT:29.6 sec  Urinalysis Basic - ( 11 Mar 2018 17:20 )    Color: x / Appearance: x / SG: x / pH: x  Gluc: x / Ketone: x  / Bili: x / Urobili: x   Blood: x / Protein: x / Nitrite: x   Leuk Esterase: x / RBC: < 5 /HPF / WBC 5-10 /HPF   Sq Epi: x / Non Sq Epi: 0-5 /HPF / Bacteria: Present /HPF        RADIOLOGY & ADDITIONAL TESTS: Reviewed.

## 2018-03-13 NOTE — PROGRESS NOTE ADULT - PROBLEM SELECTOR PLAN 5
-currently comfort care, no more labs  Cr elevated to 1.33 from baseline of 0.9 without AG, pt initially retaining urine and -most likely prerenal in the setting of poor PO intake.  - Padilla placement  - avoid nephrotoxic agents, strict I/Os  - trend BMP, monitor lytes and replete as needed

## 2018-03-13 NOTE — PROGRESS NOTE ADULT - PROBLEM SELECTOR PLAN 2
occasional periods of delirium as evidenced by pulling on bedclothes, reaching for objects that are not there.  Consider Haldol if delirium worsens

## 2018-03-13 NOTE — PROGRESS NOTE ADULT - PROBLEM SELECTOR PLAN 3
new dx of stage for lung CA, Non small cell favoring adenocarcinoma with liver and bone mets.  - f/u palliative consult  - Dilaudid, ativan PRN   - holding heme/onc intervention for now

## 2018-03-13 NOTE — PROGRESS NOTE ADULT - PROBLEM SELECTOR PLAN 6
-currently comfort care, no more labs  elevated lactate at 2.7, not responded to IVF  ddx: malignancy vs dyspnea vs liver dysfunction given mets  - disussion made for pt to be initiated for start of comfort care

## 2018-03-13 NOTE — PROGRESS NOTE ADULT - PROBLEM SELECTOR PLAN 3
appears comfortable, should anticipate that pain may be present given her c/o pain over past weeks.  continue standing Dilaudid. Does not need a continuous infusion at this time, but do not rule it out possibly later on

## 2018-03-13 NOTE — PROGRESS NOTE ADULT - PROBLEM SELECTOR PLAN 1
-2/2 non-small cell lung cancer  -currently comfort care, on HFNC  -most likely in the setting of PE, Well's score for PE is 7 considered high risk, pt unable to tolerate CTPE, vs pneumonia however less likely in the absence of cough fever, leukocytosis however RML infiltrate slightly worsen since previous admission  - discontinued Lovenox 60 mg daily, renally dosed for now   - will decide about length of Rx based on symptoms control/ prognosis and palliative recs  - continue zosyn and vancomycin as well  - symptom control with dilaudid 0.5 mg q r PRN  - pt currently on NRB will try minimal setting Bipap as tolerated to help with WOB

## 2018-03-13 NOTE — PROGRESS NOTE ADULT - PROBLEM SELECTOR PLAN 4
ongoing support to grandson. Encouraged him to call family to update them on her failing condition   Music Therapy available for support

## 2018-03-13 NOTE — CONSULT NOTE ADULT - ASSESSMENT
83 year old F with extensive smoking history and recently diagnosed Stage IV lung adenocarcinoma with mets to spine and liver admitted for dyspnea and chest pain. She is presumed to have PE being treated with Lovenox. She also was treated for PNA with IV Vanco/Zosyn. She was seen as outpatient 2 weeks ago wtih excelllent functional status however appetite and accelerated weight loss were issues. She has decompensated quickly. Grandsons at bedside and are aware of terminal prognosis     #Stage IV Lung Adenocarcinoma  Metastatic disease to liver and throughout spine. Acute decompensation from 2 weeks ago likely due to ?PE as well PNA.   -Patient's prognosis is grim and given state she is in now, our approach would be to palliate her pain and help her with her breathing.   -c/w Dilaudid q4h, Haldol PRN. Pt looks comfortable and not wincing in pain.  -f/u palliative recommendations    Case was discussed with Dr. Stroud
83 year old female with newly dx non small cell adenocarcinoma with mets to liver and spine now admitted with acute respiratory distress

## 2018-03-13 NOTE — PROGRESS NOTE ADULT - ASSESSMENT
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

## 2018-03-13 NOTE — CONSULT NOTE ADULT - SUBJECTIVE AND OBJECTIVE BOX
Hematology Oncology Consult Note (Dr. Jackson)  Discussed with Dr. Jackson and recommendations reviewed with the primary team.    The patient was seen and examined    84 yo AAF presenting to [facility] ED for acute onset severe SOB.  PMH is notable for emphysema/1/2 PPD x 60 years, quit last month, hx of TB as a child, recent admission at [facility] in feb for multilobar pneumonia initially concerning for postobstructive pneumonia in the setting of worsening Lung nodule, had a bronch and BAL was found to be positive for malignant cell,  non- small cell malignancy favoring adenocarcinoma, with evidences of liver and spinal metastasis, pt treated for pneumonia and discharged on home o2 with hem/onc follow up, she was BIBA for sudden onset of severe sob ~ 15 min, with no clear provocations or alleviations, in the ED she was tachycardic and hypoxic to 76% on 3 L NC< which improved on NRB, she was in AFib RVR at 130s, received 5 mg of IV lopressor which dropped the pressure, also had a lactate of 2.7 and received 2 L of IVF. CTPE ordered however pt unable to tolerate lying flat  CXR unchanged from previous admission however concerning for RML infiltrate, pt received zosyn and solumedrol, also ABG with PH 7.18/ 50/18/78, plan for intubation, ICU consult called, pt's prognosis and disease course discussed with HCP and pt's code status chenged to comfort measures.  pt seen and examined in the ED still tachypneic, saturating 94% on NRB, HR at 95, afebrile, awake but very lethargic somewhat following commands, lung exam remarkable for decreased breath sounds on the right side, abdomen soft, extremities cool with palpable symmetric pulsesx4. pt also started on Lovenox as epimeric treatment for PE.  on the floor was hypotermic but less tachypneic post morphine.         PAST MEDICAL & SURGICAL HISTORY:  Lung cancer metastatic to bone  Lung nodule seen on imaging study  HTN (hypertension)  Tuberculosis  H/O tracheostomy      Allergies:  aspirin (Unknown)      Medications:  enoxaparin Injectable 60 milliGRAM(s) SubCutaneous every 24 hours        Social History:    FAMILY HISTORY:  Family history of malignant melanoma (Child)  Family history of pancreatic cancer (Mother)      PHYSICAL EXAM:    T(F): 98.2 (03-13-18 @ 05:48), Max: 98.2 (03-13-18 @ 05:48)  HR: 103 (03-13-18 @ 05:48) (92 - 103)  BP: 115/60 (03-13-18 @ 05:48) (115/60 - 127/77)  RR: 18 (03-13-18 @ 05:48) (18 - 25)  SpO2: 98% (03-13-18 @ 05:48) (97% - 100%)  Wt(kg): --    Daily     Daily     Gen: Not responding to commands, agonally breathing. On high flow O2.   HEENT: normocephalic/atraumatic, no conjunctival pallor, no scleral icterus,   Neck: supple, no masses, no JVD  Cardiovascular: RR, nl S1S2, no murmurs/rubs/gallops  Respiratory: crackles b/l  Abdomen: soft, NT/ND; +BS x4  Extremities: WWP, 2+ peripheral pulses b/l; 2+ LE edema b/l  Skin: normal color and turgor; no rash  Neurological: AAOx3. No focal deficits.       Labs:                          12.0   17.7  )-----------( 315      ( 13 Mar 2018 06:35 )             37.2     CBC Full  -  ( 13 Mar 2018 06:35 )  WBC Count : 17.7 K/uL  Hemoglobin : 12.0 g/dL  Hematocrit : 37.2 %  Platelet Count - Automated : 315 K/uL  Mean Cell Volume : 87.7 fL  Mean Cell Hemoglobin : 28.3 pg  Mean Cell Hemoglobin Concentration : 32.3 g/dL  Auto Neutrophil # : x  Auto Lymphocyte # : x  Auto Monocyte # : x  Auto Eosinophil # : x  Auto Basophil # : x  Auto Neutrophil % : x  Auto Lymphocyte % : x  Auto Monocyte % : x  Auto Eosinophil % : x  Auto Basophil % : x    PT/INR - ( 12 Mar 2018 11:03 )   PT: 12.3 sec;   INR: 1.11          PTT - ( 12 Mar 2018 11:03 )  PTT:29.6 sec    03-13    139  |  104  |  37<H>  ----------------------------<  110<H>  5.4<H>   |  21<L>  |  1.80<H>    Ca    9.2      13 Mar 2018 06:35  Mg     2.3     03-13    TPro  7.1  /  Alb  3.4  /  TBili  0.4  /  DBili  x   /  AST  180<H>  /  ALT  234<H>  /  AlkPhos  309<H>  03-13      Urinalysis Basic - ( 11 Mar 2018 17:20 )    Color: x / Appearance: x / SG: x / pH: x  Gluc: x / Ketone: x  / Bili: x / Urobili: x   Blood: x / Protein: x / Nitrite: x   Leuk Esterase: x / RBC: < 5 /HPF / WBC 5-10 /HPF   Sq Epi: x / Non Sq Epi: 0-5 /HPF / Bacteria: Present /HPF        Other Labs:    Cultures:    Pathology:    Imaging Studies:
CARISSA NIELSEN   MRN-[id number]    : 1934    HPI:  82 yo female  presenting to [facility] ED for acute onset severe SOB. PMH is notable for emphysema/1/2 PPD x 60 years, quit last month, hx of TB as a child with long-term hospitalization, recent admission at [facility] -3/2 for multilobar pneumonia initially concerning for postobstructive pneumonia in the setting of worsening Lung nodule, had a bronch and BAL was found to be positive for malignant cell,non- small cell malignancy favoring adenocarcinoma, with evidences of liver and spinal metastasis, pt treated for pneumonia and discharged on home o2 with hem/onc follow up, she was BIBA for sudden onset of severe sob ~ 15 min, with no clear provocations or alleviations, Pt states that she was able to ambulate short distances with use of O2 upon discharge, but SOB has increased alot over past few days. She states  that she has no appetite and that the smell of food makes her nauseous. States she has had a 10 lb weight loss in past month    in the ED she was tachycardic and hypoxic to 76% on 3 L NC< which improved on NRB, she was in AFib RVR at 130s, received 5 mg of IV lopressor which dropped the pressure, also had a lactate of 2.7 and received 2 L of IVF.   CXR unchanged from previous admission however concerning for RML infiltrate, pt received zosyn and solumedrol pt Presently of Hi flow O2 FIO2 65% Received multiple doses of IV DIlaudid for SOB with good effect    PAST MEDICAL & SURGICAL HISTORY:  Lung cancer metastatic to bone  Lung nodule seen on imaging study  HTN (hypertension)  Tuberculosis  H/O tracheostomy    FAMILY HISTORY:  Family history of malignant melanoma (Child)  Family history of pancreatic cancer (Mother)    ROS:                      Dyspnea (Sandra 0-10):  8/10                      N/V (Y/N):  nausea without vomiting                             Secretions (Y/N) :   NO             Agitation(Y/N): No  Pain (Y/N): c/o intermittent dull achy pain radiating across her lower back which is presently 0/10 on pain scale Escalates to 6/10 on pain scale at worst. Pain is exacerbated  with standing and sitting and is relieved with lying down and use of Allev. States her independence has been affected      General:  malaise  HEENT:    Denied  Neck:  Denied  CVS:  tachycardia  Resp: SOB, cough  GI:  nausea, anorexia  :  Denied  Musc: weakness  Neuro:  Denied  Psych:  anxiety  Skin:  Denied  Lymph:  Denied    Allergies    aspirin (Unknown)    Intolerances    Opiate Naive (Y/N): Yes prior to admission   -iStop reviewed (Y/N): Yes IRef: [id number]    Medications:      MEDICATIONS  (STANDING):  enoxaparin Injectable 60 milliGRAM(s) SubCutaneous every 24 hours  piperacillin/tazobactam IVPB. 2.25 Gram(s) IV Intermittent every 6 hours  vancomycin  IVPB 750 milliGRAM(s) IV Intermittent every 24 hours  vancomycin  IVPB        MEDICATIONS  (PRN):  ALBUTerol/ipratropium for Nebulization 3 milliLiter(s) Nebulizer every 6 hours PRN SOB/wheezing  HYDROmorphone  Injectable 0.5 milliGRAM(s) IV Push every 4 hours PRN SOB      Labs:    CBC:                        12.2   11.9  )-----------( 303      ( 12 Mar 2018 11:03 )             37.7     CMP:        135  |  99  |  34<H>  ----------------------------<  132<H>  4.9   |  18<L>  |  1.68<H>    Ca    9.1      12 Mar 2018 11:03  Mg     1.7         TPro  7.5  /  Alb  3.5  /  TBili  0.6  /  DBili  x   /  AST  414<H>  /  ALT  283<H>  /  AlkPhos  365<H>  0312    PT/INR - ( 12 Mar 2018 11:03 )   PT: 12.3 sec;   INR: 1.11          PTT - ( 12 Mar 2018 11:03 )  PTT:29.6 sec  Urinalysis Basic - ( 11 Mar 2018 17:20 )    Color: x / Appearance: x / SG: x / pH: x  Gluc: x / Ketone: x  / Bili: x / Urobili: x   Blood: x / Protein: x / Nitrite: x   Leuk Esterase: x / RBC: < 5 /HPF / WBC 5-10 /HPF   Sq Epi: x / Non Sq Epi: 0-5 /HPF / Bacteria: Present /HPF    BC: NGTD    Imaging:    EXAM:  PETCT SKUL-THI ONC FDG INIT                          PROCEDURE DATE:  2018          INTERPRETATION:  PET-CT Scan    History: Multiple pulmonary nodules. Baseline study to help determine   initial treatment strategy.     Procedure: Following at least 4 hour fasting, the patient's blood glucose   was 93 mg/dl.  One hour following the injection of 8.92 mCi of F-18-FDG,   noncontrast CT images were obtained from the skull base through the   pelvis.  Then, emission PET imageswere obtained through the same region.    Attenuation corrected images were constructed using the CT scan.  Fused   images of PET and CT were reviewed.  The standard uptake values (SUV)   reported below are maximum values within the region of interest,   expressed in gm/mL.    Comparison: Most recent chest CT from 2018. Additional prior chest   CTs dating back to 3/8/2017.    Findings: Images of the head and neck demonstrate a hypermetabolic 0.7 cm   lymph node in the left supraclavicular region.    There is increased FDG uptake within a right pleural thickening in the   right hemithorax, consistent with metastatic disease. There is also   abnormal activity with SUV of 5.2 within the consolidated right middle   lobe. The 7 mm nodule in the left apex has an SUV of 1.6. 8 mm spiculated   nodule in the posterior right upper lobe has an SUV of 2.5. There are   peripherally located areas of groundglass opacity in the left upper lobe   and left lower lobe with increased FDG uptake on the order of SUV of 5.0.   These areas have subtle "reverse halo" appearances. There is again severe   emphysema. Interlobular septal thickening again present throughout right   lung. Peripherally located regions of consolidation are again present in   theright middle lobe and right lower lobe. No left pleural effusion.    There are again calcified lymph nodes in the right paratracheal and right   hilar regions. There is increased activity within several noncalcified   thoracic lymph nodes, suspiciousfor metastatic disease. A few of the   hypermetabolic lymph nodes include a 0.6 cm station six lymph node, a 1.2   cm station five lymph node, a 1.3 cm station 4R lymph node, and a 1.1 cm   station 11 R lymph node.    There are several hypermetabolicliver masses, consistent with hepatic   metastatic disease. The largest is a 2.8 cm mass in segment seven with   SUV of 9.5. No radiopaque stones gallbladder. Spleen, pancreas, adrenal   glands, and kidneys unremarkable.    There is hypermetabolic adenopathy in the right upper quadrant, with   several enlarged periportal lymph nodes present. There is a low density,   follow up in a structural or posterior to the pancreatic head. It is   uncertain if this is a fluid-filled bowel or a cystic structure. There is   no ascites in abdomen or pelvis.    Examination of the gastrointestinal tract is limited without oral   contrast material. There are multiple colonic diverticula.    There is again an enlarged, fibroid uterus containing multiple calcified   fibroids.    There are multiple hypermetabolic, sclerotic osseous lesions, consistent   with metastatic disease. These lesions are present throughout the   thoracolumbar spine, in the sacrum, in the right ilium, and in the left   superior pubic ramus. Status post left total knee replacement.    Impression: 1. Increased activity within consolidated right middle lobe   and within bilateral lung nodules, suspicious for lung cancer.    2. Increased activity within rind of pleural thickening right hemithorax,   consistent with metastatic disease.    3. Hypermetabolic left supraclavicular, thoracic and abdominal lymph   nodes, consistent with metastatic disease.    4. Hypermetabolic liver lesions, consistent with hepatic metastatic   disease.    5. Hypermetabolic osseous lesions, consistent with metastatic disease.    6. Interlobular septal thickening right lung, consistent with   lymphangitic carcinomatosis.    7. Hypermetabolic, peripherally located areas of groundglass opacity with   "reverse halo" appearances in left lung. These could represent additional   sites of malignancy versus organizing pneumonia.    EXAM:  XR CHEST AP OR PA 1V                          PROCEDURE DATE:  2018          INTERPRETATION:  AP chest x-ray    History: Shortness of breath.    Comparison made with chest x-ray from 2018 and with PET/CT from   2018.    Findings: There is again masslike consolidation at the right base, with   small right pleural thickening/effusion. Increased interstitial markings   throughout right lung, could represent worsening lymphangitic   carcinomatosis and/or superimposed infection. Tiny calcified nodule left   upper lobe. No left pleural effusion.    Impression: Increased interstitial markings right lung could represent   worsening lymphangitic carcinomatosis and/or superimposed infection.    PEx:  T(C): 36.3 (18 @ 11:40), Max: 36.3 (18 @ 11:40)  HR: 95 (18 @ 11:40) (80 - 113)  BP: 127/77 (18 @ 11:40) (86/58 - 127/77)  RR: 22 (18 @ 11:40) (16 - 30)  SpO2: 100% (18 @ 11:40) (86% - 100%)  Daily Height in cm: 165.1 (11 Mar 2018 16:18)    Daily Weight in k.7 (11 Mar 2018 16:18)  CAPILLARY BLOOD GLUCOSE    I&O's Summary    11 Mar 2018 07:01  -  12 Mar 2018 07:00  --------------------------------------------------------  IN: 1175 mL / OUT: 110 mL / NET: 1065 mL    12 Mar 2018 07:01  -  12 Mar 2018 15:02  --------------------------------------------------------  IN: 0 mL / OUT: 300 mL / NET: -300 mL        General: 69 yo AA female in bed with SOB  HEENT: A/T N/C sclera anicteric, MMM   Neck: supple No JVD  CVS: S1S2 RRR tachycardic, no MRG  Resp: poor respiratory effort, no Rhonchi  GI:  soft NT + BS  : arreguin   Musc: MELENDEZ with  weakness   Neuro: no focal deficits  Psych:  anxious, cooperative   Skin: warm to touch  Lymph:No LAD  Preadmit Karnofsky: 50 %           Current Karnofsky:    30 %  Cachexia (Y/N): N  BMI: 21.8    Advanced Directives:          DNR/DNI     MOLST     HCP    Decision maker: Carissa Nielsen  Legal surrogate: HCP form done 3/12 assigned grandson Jad Simons II as proxy    Social History:  2 children daughter , son local 5 grandchildren, daughters 3 sons raised by pt oldest  llives with her.  Sister  x 1 month agoRetired , Confucianist  quit smoking in FED  GOALS OF CARE DISCUSSION       Palliative care info/counseling provided	           Advanced Directives addressed please see Advance Care Planning Note	           Documentation of GOC: active symptom management at this time 	          REFERRALS	        Palliative Med        Unit SW/Case Mgmt       : refused       Patient/Family Support       Massage Therapy       Music Therapy       Nutrition

## 2018-03-13 NOTE — PROGRESS NOTE ADULT - ASSESSMENT
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

## 2018-03-13 NOTE — PROGRESS NOTE ADULT - PROBLEM SELECTOR PLAN 4
-currently comfort care  -no hx of a-fib, pt s/p 5 mg IV lopressor in ED, currently rate controlled  [id number] ~ 4 given current disease prognosis no concern for stroke prevention, however pt on AC torres empiric  Rx of PE

## 2018-03-13 NOTE — PROGRESS NOTE ADULT - SUBJECTIVE AND OBJECTIVE BOX
TOREY CARMICHAEL   MRN-[id number]    CC: agonal breathing, decreased mental status    HPI:  82 yo female  presenting to [facility] ED for acute onset severe SOB. PMH is notable for emphysema/1/2 PPD x 60 years, quit last month, hx of TB as a child with long-term hospitalization, recent admission at [facility] -3/2 for multilobar pneumonia initially concerning for postobstructive pneumonia in the setting of worsening Lung nodule, had a bronch and BAL was found to be positive for malignant cell,non- small cell malignancy favoring adenocarcinoma, with evidences of liver and spinal metastasis, pt treated for pneumonia and discharged on home o2 with hem/onc follow up, she was BIBA for sudden onset of severe sob ~ 15 min, with no clear provocations or alleviations, Pt states that she was able to ambulate short distances with use of O2 upon discharge, but SOB has increased alot over past few days. She states  that she has no appetite and that the smell of food makes her nauseous. States she has had a 10 lb weight loss in past month    in the ED she was tachycardic and hypoxic to 76% on 3 L NC< which improved on NRB, she was in AFib RVR at 130s, received 5 mg of IV lopressor which dropped the pressure, also had a lactate of 2.7 and received 2 L of IVF.   CXR unchanged from previous admission however concerning for RML infiltrate, pt received zosyn and solumedrol pt Presently of Hi flow O2 FIO2 65% Received multiple doses of IV DIlaudid for SOB with good effect    Subjective: pt examined and appears in her active dying process as evidenced by agonal breathing patterns with 1-2 second periods of apnea She appears with intermittent terminal delirium, pulling at her night clothes.  breathing much less labored this AM Yusef Mendoza at bedside.     ROS:                      Dyspnea (Sandra 0-10):  2/10                      N/V (Y/N):  No                        Secretions (Y/N) :   NO             Agitation(Y/N): + mild intermittent delirium  Pain (Y/N):no grimacing or moaning observed  PEx:  T(C): 36.8 (18 @ 05:48), Max: 36.8 (18 @ 05:48)  HR: 103 (18 @ 05:48) (99 - 103)  BP: 115/60 (18 @ 05:48) (115/60 - 123/71)  RR: 18 (18 @ 05:48) (18 - 20)  SpO2: 98% (18 @ 05:48) (97% - 100%)  Wt(kg): --    General: 82 yo AA female in bed minimally responds to verbal name calling  HEENT: A/T N/C sclera anicteric, MMM   Neck: supple No JVD  CVS: S1S2 irregular, tachycardic, thready pulse  Resp: + agonal breathing, short periods of apnea +Hi Flow N/C  GI:  soft NT + BS  : arreguin with decreasing u/o  Musc: MELENDEZ with  weakness   Neuro: no focal deficits  Psych:  anxious, cooperative   Skin: periphery cool to touch  Lymph :No LAD       aspirin (Unknown)      OPIATE NAÏVE (Y/N):  iSTOP REVIEWED (Y/N):     MEDICATIONS: REVIEWED  MEDICATIONS  (STANDING):  dexamethasone     Tablet 4 milliGRAM(s) Oral every 12 hours  HYDROmorphone  Injectable 0.5 milliGRAM(s) IV Push every 4 hours  LORazepam   Injectable 0.5 milliGRAM(s) IV Push every 12 hours  pantoprazole    Tablet 40 milliGRAM(s) Oral before breakfast  polyethylene glycol 3350 17 Gram(s) Oral two times a day    MEDICATIONS  (PRN):  ALBUTerol/ipratropium for Nebulization 3 milliLiter(s) Nebulizer every 6 hours PRN SOB/wheezing  haloperidol    Injectable 0.5 milliGRAM(s) IV Push every 6 hours PRN Delirium  HYDROmorphone  Injectable 0.5 milliGRAM(s) IV Push every 1 hour PRN Severe Pain (7 - 10)  LORazepam   Injectable 0.5 milliGRAM(s) IV Push every 4 hours PRN Anxiety      LABS: REVIEWED                        12.0   17.7  )-----------( 315      ( 13 Mar 2018 06:35 )             37.2       IMAGING: REVIEWED    ADVANCED DIRECTIVES:    DNR/DNI     MOLST     HCP    Decision maker: Jad Simons II   Legal surrogate: HCP form done 3/12 assigned grandson Jad Simons II as proxy    Social History:  2 children daughter , son local 5 grandchildren, daughters 3 sons raised by pt oldest  llives with her.  Sister  x 1 month agoRetired , Sikh quit smoking in FEB      GOALS OF CARE DISCUSSION       Palliative care info/counseling provided	           Documentation of GOC: comfort measures to assure peaceful and dignified death	          REFERRALS	        Palliative Med        Unit SW/Case Mgmt       : refused       Patient/Family Support       Massage Therapy       Music Therapy       Nutrition

## 2018-03-13 NOTE — PROGRESS NOTE ADULT - PROBLEM SELECTOR PLAN 7
initiating end of life care, no MEWS, DNR/DNI, however continuing with IV abx, VS q 8 hours for now  -Dilaudid 0.5mg q4 standing, dilaudid 0.5mg prn q1hrs for breakthough pain, decadron 4mg q12, haldol 0.5q6 prn, ativan 0.5iv q12 standing and ativan 0.5mg q4 prn for comfort measures  -f/u palliative recs initiating end of life care, no MEWS, DNR/DNI  -Dilaudid gtt, dialudid 0.5mg q4 standing, dilaudid 0.5mg prn q1hrs for breakthough pain, decadron 4mg q12, haldol 0.5q6 prn, ativan 0.5iv q12 standing and ativan 0.5mg q4 prn for comfort measures  -f/u palliative recs

## 2018-03-14 VITALS — RESPIRATION RATE: 19 BRPM | OXYGEN SATURATION: 99 %

## 2018-03-14 PROCEDURE — 83605 ASSAY OF LACTIC ACID: CPT

## 2018-03-14 PROCEDURE — 93005 ELECTROCARDIOGRAM TRACING: CPT

## 2018-03-14 PROCEDURE — 96375 TX/PRO/DX INJ NEW DRUG ADDON: CPT

## 2018-03-14 PROCEDURE — 97116 GAIT TRAINING THERAPY: CPT

## 2018-03-14 PROCEDURE — 99291 CRITICAL CARE FIRST HOUR: CPT | Mod: 25

## 2018-03-14 PROCEDURE — 87086 URINE CULTURE/COLONY COUNT: CPT

## 2018-03-14 PROCEDURE — 99239 HOSP IP/OBS DSCHRG MGMT >30: CPT

## 2018-03-14 PROCEDURE — 82803 BLOOD GASES ANY COMBINATION: CPT

## 2018-03-14 PROCEDURE — 83880 ASSAY OF NATRIURETIC PEPTIDE: CPT

## 2018-03-14 PROCEDURE — 94640 AIRWAY INHALATION TREATMENT: CPT

## 2018-03-14 PROCEDURE — 85730 THROMBOPLASTIN TIME PARTIAL: CPT

## 2018-03-14 PROCEDURE — 87040 BLOOD CULTURE FOR BACTERIA: CPT

## 2018-03-14 PROCEDURE — 85610 PROTHROMBIN TIME: CPT

## 2018-03-14 PROCEDURE — 80053 COMPREHEN METABOLIC PANEL: CPT

## 2018-03-14 PROCEDURE — 97530 THERAPEUTIC ACTIVITIES: CPT

## 2018-03-14 PROCEDURE — 83735 ASSAY OF MAGNESIUM: CPT

## 2018-03-14 PROCEDURE — 84484 ASSAY OF TROPONIN QUANT: CPT

## 2018-03-14 PROCEDURE — 85025 COMPLETE CBC W/AUTO DIFF WBC: CPT

## 2018-03-14 PROCEDURE — 81001 URINALYSIS AUTO W/SCOPE: CPT

## 2018-03-14 PROCEDURE — 36415 COLL VENOUS BLD VENIPUNCTURE: CPT

## 2018-03-14 PROCEDURE — 94660 CPAP INITIATION&MGMT: CPT

## 2018-03-14 PROCEDURE — 96374 THER/PROPH/DIAG INJ IV PUSH: CPT

## 2018-03-14 PROCEDURE — 71045 X-RAY EXAM CHEST 1 VIEW: CPT

## 2018-03-14 RX ADMIN — HYDROMORPHONE HYDROCHLORIDE 0.5 MILLIGRAM(S): 2 INJECTION INTRAMUSCULAR; INTRAVENOUS; SUBCUTANEOUS at 10:00

## 2018-03-14 RX ADMIN — HYDROMORPHONE HYDROCHLORIDE 0.5 MILLIGRAM(S): 2 INJECTION INTRAMUSCULAR; INTRAVENOUS; SUBCUTANEOUS at 09:34

## 2018-03-14 NOTE — PROGRESS NOTE ADULT - PROBLEM SELECTOR PLAN 8
-currently comfort care, no more vitals  -borderline low Bp  - holding off bp meds
borderline low Bp  - holding off bp meds
-currently comfort care, no more vitals  -borderline low Bp  - holding off bp meds

## 2018-03-14 NOTE — PROGRESS NOTE ADULT - PROBLEM SELECTOR PROBLEM 5
Atrial fibrillation, unspecified type
Dying care
EDWARD (acute kidney injury)
EDWARD (acute kidney injury)

## 2018-03-14 NOTE — PROGRESS NOTE ADULT - ASSESSMENT
82 yo AAF with h/o non-small cell lung carcinoma with known mets to liver and spine, emphesema 2/2 smoking, HTN and TB admitted for acute respiratory failure.

## 2018-03-14 NOTE — PROGRESS NOTE ADULT - PROBLEM SELECTOR PLAN 5
-currently comfort care  -no hx of a-fib, pt s/p 5 mg IV lopressor in ED, currently rate controlled  [id number] ~ 4 given current disease prognosis no concern for stroke prevention, however pt on AC torres empiric  Rx of PE

## 2018-03-14 NOTE — DISCHARGE NOTE FOR THE EXPIRED PATIENT - HOSPITAL COURSE
82 yo AAF with h/o non-small cell lung carcinoma with known mets to liver and spine, emphesema 2/2 smoking, HTN and TB admitted for acute respiratory failure likely 2/2 pulmonary embolism. Patient was made comfort care per patient's wishes. Palliative was consulted. Patient was pronounced dead at 12:37PM on 3/14/18 based on cardiopulmonary criteria.

## 2018-03-14 NOTE — PROGRESS NOTE ADULT - PROBLEM SELECTOR PROBLEM 6
EDWARD (acute kidney injury)
Lactic acidosis
Medical orders for life-sustaining treatment (MOLST) form in chart
Lactic acidosis

## 2018-03-14 NOTE — PROGRESS NOTE ADULT - SUBJECTIVE AND OBJECTIVE BOX
INTERVAL HPI/OVERNIGHT EVENTS: Received ativan prn at 11:00 pm for agitation.     SUBJECTIVE: Patient seen and examined at bedside. Continues to have agonal breaths (RR10). Patient is minimally arousal.     OBJECTIVE:    VITAL SIGNS:  ICU Vital Signs Last 24 Hrs  T(C): 36.3 (14 Mar 2018 05:38), Max: 36.3 (14 Mar 2018 05:38)  T(F): 97.3 (14 Mar 2018 05:38), Max: 97.3 (14 Mar 2018 05:38)  HR: 117 (14 Mar 2018 05:38) (98 - 117)  BP: 137/63 (14 Mar 2018 05:38) (109/71 - 137/63)  BP(mean): --  ABP: --  ABP(mean): --  RR: 19 (14 Mar 2018 06:50) (10 - 19)  SpO2: 99% (14 Mar 2018 06:50) (98% - 100%)        03-13 @ 07:01  -  03-14 @ 07:00  --------------------------------------------------------  IN: 9.6 mL / OUT: 225 mL / NET: -215.4 mL      CAPILLARY BLOOD GLUCOSE          PHYSICAL EXAM:  General: NAD  HEENT: NC/AT  Neck: supple  Respiratory: crackles b/l  Cardiovascular: +systolic murmur; RRR  Abdomen: soft, NT/ND; +BS x4  Extremities: WWP, 2+ peripheral pulses b/l; 2+ LE edema b/l  Skin: normal color and turgor; no rash  Neurological: minimally arousable    MEDICATIONS:  MEDICATIONS  (STANDING):  dexamethasone     Tablet 4 milliGRAM(s) Oral every 12 hours  HYDROmorphone Infusion 0.2 mG/Hr (1 mL/Hr) IV Continuous <Continuous>  LORazepam   Injectable 0.5 milliGRAM(s) IV Push every 12 hours  pantoprazole    Tablet 40 milliGRAM(s) Oral before breakfast  polyethylene glycol 3350 17 Gram(s) Oral two times a day    MEDICATIONS  (PRN):  ALBUTerol/ipratropium for Nebulization 3 milliLiter(s) Nebulizer every 6 hours PRN SOB/wheezing  haloperidol    Injectable 0.5 milliGRAM(s) IV Push every 6 hours PRN Delirium  HYDROmorphone  Injectable 0.5 milliGRAM(s) IV Push every 1 hour PRN breakthrough pain, anxiety, air hunger  LORazepam   Injectable 0.5 milliGRAM(s) IV Push every 4 hours PRN Anxiety      ALLERGIES:  Allergies    aspirin (Unknown)    Intolerances        LABS:                        12.0   17.7  )-----------( 315      ( 13 Mar 2018 06:35 )             37.2     03-13    139  |  104  |  37<H>  ----------------------------<  110<H>  5.4<H>   |  21<L>  |  1.80<H>    Ca    9.2      13 Mar 2018 06:35  Mg     2.3     03-13    TPro  7.1  /  Alb  3.4  /  TBili  0.4  /  DBili  x   /  AST  180<H>  /  ALT  234<H>  /  AlkPhos  309<H>  03-13    PT/INR - ( 12 Mar 2018 11:03 )   PT: 12.3 sec;   INR: 1.11          PTT - ( 12 Mar 2018 11:03 )  PTT:29.6 sec      RADIOLOGY & ADDITIONAL TESTS: Reviewed.

## 2018-03-14 NOTE — PROGRESS NOTE ADULT - PROBLEM SELECTOR PROBLEM 1
Acute respiratory failure with hypercapnia
Agonal rhythm
End of life care
Acute respiratory failure with hypercapnia

## 2018-03-14 NOTE — PROGRESS NOTE ADULT - PROBLEM SELECTOR PLAN 1
-initiating end of life care, no MEWS, no vitals, DNR/DNI  -c/w Dilaudid gtt, dialudid 0.5mg q4 standing, dilaudid 0.5mg prn q1hrs for breakthough pain, decadron 4mg q12, haldol 0.5q6 prn, ativan 0.5iv q12 standing and ativan 0.5mg q4 prn for comfort measures  -f/u palliative recs

## 2018-03-14 NOTE — PROGRESS NOTE ADULT - ATTENDING COMMENTS
Pt seen and examined at 9:30 am, VS reviewed, exam as above- comfortable this AM, no grimacing or tachypnea, tachycardic, some rhonchi, breathing with mouth open and dry MM. No labs.   84 yo F with   1. 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 Hypercapneic respiratory failure  3. Shortness of breath 2/2 malignancy  4. Severe protein calorie malnutrition  5. Anticipatory nausea  6. Pain  7. Actively dying  - appreciate palliative consult  - d/c abx and AC and other nonessential meds  - d/c HFNC and switched to NC  - on dilaudid gtt with prn dilaudid hourly, ativan and haldol prn  - comfortable in AM today, later called by HS that pt  with family at bedside  - 35 min on coordination of care, d/w HS and RN
Pt seen and examined, VS reviewed, exam as above- pt on HFNC, tachypneic, agitated at times, exam as above, labs reviewed,   82 yo F with   1. 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 Hypercapneic respiratory failure  3. Shortness of breath 2/2 malignancy  4. Severe protein calorie malnutrition  5. Anticipatory nausea  6. Pain  7. Actively dying  - appreciate palliative consult  - d/c abx and AC  - on HFNC, will not d/c at this time  - on dilaudid gtt with prn dilaudid hourly, ativan and haldol prn  - will reassess frequently for comfort  - will likely die in hours to days  - d/w HS, palliative, RNs, and family
Pt seen and examined, VSS, exam as above- pt on HFNC, tachypneic, exam as above, labs reviewed, d/w pt and two grandsons at length.  They all understand pt has advanced incurable disease and tx of cancer with chemo is not possible based on her acuity at this time.  She endorses that comfort is her priority and acknowledges her life is short. Grandson tearful at bedside.    82 yo F with   1. 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 Hypercapneic respiratory failureShortness of breath 2/2 malignancy  3. Severe protein calorie malnutrition  4. Anticipatory nausea  5. Pain  - appreciate palliative consult  - will c/w abx and lovenox for now  - working to wean pt off HFNC  - dilaudid standing and prn for SOB and pain  - ativan BID for nausea  - will start bowel regimen  - will f/u response to above, will likely need to go to an inpatient hospice unit

## 2018-03-14 NOTE — PROGRESS NOTE ADULT - PROBLEM SELECTOR PROBLEM 4
Atrial fibrillation, unspecified type
Lung cancer metastatic to bone
Palliative care by specialist
Atrial fibrillation, unspecified type

## 2018-03-16 DIAGNOSIS — C79.51 SECONDARY MALIGNANT NEOPLASM OF BONE: ICD-10-CM

## 2018-03-16 DIAGNOSIS — J43.9 EMPHYSEMA, UNSPECIFIED: ICD-10-CM

## 2018-03-16 DIAGNOSIS — E87.2 ACIDOSIS: ICD-10-CM

## 2018-03-16 DIAGNOSIS — C34.90 MALIGNANT NEOPLASM OF UNSPECIFIED PART OF UNSPECIFIED BRONCHUS OR LUNG: ICD-10-CM

## 2018-03-16 DIAGNOSIS — C78.7 SECONDARY MALIGNANT NEOPLASM OF LIVER AND INTRAHEPATIC BILE DUCT: ICD-10-CM

## 2018-03-16 DIAGNOSIS — Z86.11 PERSONAL HISTORY OF TUBERCULOSIS: ICD-10-CM

## 2018-03-16 DIAGNOSIS — I26.99 OTHER PULMONARY EMBOLISM WITHOUT ACUTE COR PULMONALE: ICD-10-CM

## 2018-03-16 DIAGNOSIS — Z87.891 PERSONAL HISTORY OF NICOTINE DEPENDENCE: ICD-10-CM

## 2018-03-16 DIAGNOSIS — N17.9 ACUTE KIDNEY FAILURE, UNSPECIFIED: ICD-10-CM

## 2018-03-16 DIAGNOSIS — E43 UNSPECIFIED SEVERE PROTEIN-CALORIE MALNUTRITION: ICD-10-CM

## 2018-03-16 DIAGNOSIS — J96.22 ACUTE AND CHRONIC RESPIRATORY FAILURE WITH HYPERCAPNIA: ICD-10-CM

## 2018-03-16 DIAGNOSIS — R06.02 SHORTNESS OF BREATH: ICD-10-CM

## 2018-03-16 LAB
CULTURE RESULTS: SIGNIFICANT CHANGE UP
CULTURE RESULTS: SIGNIFICANT CHANGE UP
RESPIRATORY PATH PNL SPEC CULT: SIGNIFICANT CHANGE UP
SPECIMEN SOURCE: SIGNIFICANT CHANGE UP
SPECIMEN SOURCE: SIGNIFICANT CHANGE UP

## 2018-03-17 ENCOUNTER — APPOINTMENT (OUTPATIENT)
Dept: MRI IMAGING | Facility: HOSPITAL | Age: 83
End: 2018-03-17

## 2018-03-28 LAB
CULTURE RESULTS: SIGNIFICANT CHANGE UP
CULTURE RESULTS: SIGNIFICANT CHANGE UP
SPECIMEN SOURCE: SIGNIFICANT CHANGE UP
SPECIMEN SOURCE: SIGNIFICANT CHANGE UP

## 2019-09-30 NOTE — ED ADULT NURSE NOTE - PRIMARY CARE PROVIDER
non-affiliated Implemented All Universal Safety Interventions:  [place] to call system. Call bell, personal items and telephone within reach. Instruct patient to call for assistance. Room bathroom lighting operational. Non-slip footwear when patient is off stretcher. Physically safe environment: no spills, clutter or unnecessary equipment. Stretcher in lowest position, wheels locked, appropriate side rails in place.

## 2020-10-24 NOTE — ED PROVIDER NOTE - OBJECTIVE STATEMENT
82 yo female with previous history of R lung nodule, no prior cardiac history presenting with 1 week of worsening SOB, cough [place]. Pt denies chest pain however states that she has a constant discomfort associated with the SOB. pt denies lower ext edema. Had viral GE on Monday with nausea and vomiting and has felt worse since then. Negative

## 2022-06-22 NOTE — PROVIDER CONTACT NOTE (CRITICAL VALUE NOTIFICATION) - TEST AND RESULT REPORTED:
Pt aware and verbalized understanding.  Information regarding LEEP sent through the [facility]  Pt aware she will be contacted regarding scheduling the LEEP in [place]   pH 7.21 lactate 2.7

## 2023-02-16 NOTE — CONSULT NOTE ADULT - SUBJECTIVE AND OBJECTIVE BOX
Administered depo shot to patient. Patient tolerated well.  
Hematology Oncology Consult Note (Dr. Jackson)  Discussed with Dr. Jackson and recommendations reviewed with the primary team.    The patient was seen and examined    TOREY CARMICHAEL is a 83y Female with history of extensive smoking, COPD, and TB as a child who presented with dyspnea and cough last week. Pt reports that she was previously able to ambulate up to 20 city blocks without difficulty, however, has been feeling progressively SOB w/ minimal exertion.  She reports a chronic cough productive of white sputum, however, endorses an increase in her cough and sputum production over the last two weeks, + yellow sputum.  Associated w/ intermittent hot flashes, no fevers or chills. Also reports significant weight loss in the past month, however, unable to quantify amount lost. She had TB at age 3 and was hospitalized for 4 yrs for treatment, denies recurrence. Pt has been smoking 1/2 PPD x 67 yrs, stopped smoking last week.  Follows w/ Dr. Thornton for monitoring of lung nodules discovered in March 2017.  Most concerning lung nodule in RLL, grew from 5 mm in 7/17 to 7 mm in 10/17.      Chest CT showed new areas of consolidation. PET/CT shows extensive disease with increased FDG avid uptakle in the liver, spine, and thoracic adenopathy.     She had a great performance status prior to last week when she had dyspnea and cough. She notes 10 lb weight loss in the past 1 month. She still remains active, lives with grandson.     ROS is otherwise negative.      PAST MEDICAL & SURGICAL HISTORY:  Lung nodule seen on imaging study  HTN (hypertension)  Tuberculosis  H/O tracheostomy      Allergies:  aspirin (Unknown)      MEDICATIONS  (STANDING):  ALBUTerol/ipratropium for Nebulization 3 milliLiter(s) Nebulizer every 6 hours  amLODIPine   Tablet 5 milliGRAM(s) Oral daily  azithromycin  IVPB 500 milliGRAM(s) IV Intermittent every 24 hours  cefTRIAXone   IVPB 1 Gram(s) IV Intermittent every 24 hours  heparin  Injectable 5000 Unit(s) SubCutaneous every 8 hours  losartan 50 milliGRAM(s) Oral daily    MEDICATIONS  (PRN):          Social History:  Lives with grandson  Used to work in [employer]  Smoked 1/2 ppd for the past 75 years  Drinks 3-4 beers daily for many years  no illicit drug use     FAMILY HISTORY:  Family history of malignant melanoma (Child)  Family history of pancreatic cancer (Mother)      PHYSICAL EXAM:    T(F): 98.3 (02-28-18 @ 15:37), Max: 98.9 (02-28-18 @ 05:30)  HR: 84 (02-28-18 @ 15:37) (84 - 92)  BP: 110/63 (02-28-18 @ 15:37) (102/58 - 145/76)  RR: 16 (02-28-18 @ 15:37) (16 - 18)  SpO2: 100% (02-28-18 @ 15:37) (96% - 100%)  Wt(kg): --    Daily     Daily     General: A&Ox 3; Elderly female sitting in bed in NAD  Head: NC/AT;   Eyes: PERRL; EOMI; anicteric sclera  Neck: Supple; no JVD  Respiratory: Clear to auscultation bilaterally  Cardiovascular: Regular rhythm/rate; S1/S2; no gallops or murmurs auscultated  Gastrointestinal: Soft; NTND w/out rebound tenderness or guarding; bowel sounds normal  Extremities: WWP; no edema or cyanosis; radial/pedal pulses palpable, dry skin on bilateral lower extremities  Neurological:  CNII-XII grossly intact; no obvious focal deficits          Labs:                          11.9   7.8   )-----------( 410      ( 28 Feb 2018 07:11 )             35.7     CBC Full  -  ( 28 Feb 2018 07:11 )  WBC Count : 7.8 K/uL  Hemoglobin : 11.9 g/dL  Hematocrit : 35.7 %  Platelet Count - Automated : 410 K/uL  Mean Cell Volume : 84.0 fL  Mean Cell Hemoglobin : 28.0 pg  Mean Cell Hemoglobin Concentration : 33.3 g/dL  Auto Neutrophil # : x  Auto Lymphocyte # : x  Auto Monocyte # : x  Auto Eosinophil # : x  Auto Basophil # : x  Auto Neutrophil % : x  Auto Lymphocyte % : x  Auto Monocyte % : x  Auto Eosinophil % : x  Auto Basophil % : x    PT/INR - ( 28 Feb 2018 07:11 )   PT: 11.9 sec;   INR: 1.07          PTT - ( 28 Feb 2018 07:11 )  PTT:27.5 sec    02-28    135  |  97  |  17  ----------------------------<  95  4.6   |  27  |  1.01    Ca    9.8      28 Feb 2018 07:11  Mg     1.7     02-28    TPro  6.7  /  Alb  3.3  /  TBili  0.5  /  DBili  x   /  AST  30  /  ALT  29  /  AlkPhos  239<H>  02-28          Other Labs:    Cultures:    Pathology:    Imaging Studies:

## 2023-05-26 NOTE — ED ADULT NURSE NOTE - FALL HARM RISK
age(85 years old or older) Low Dose Naltrexone Pregnancy And Lactation Text: Naltrexone is pregnancy category C.  There have been no adequate and well-controlled studies in pregnant women.  It should be used in pregnancy only if the potential benefit justifies the potential risk to the fetus.   Limited data indicates that naltrexone is minimally excreted into breastmilk.

## 2023-06-22 NOTE — PROGRESS NOTE ADULT - ASSESSMENT
Presents with pain to left knee after twisting it doing yard work yesterday. pt reports she stepped in a hole and fell, hearing a \"pop\" to the inside of the left knee as it twisted. Pt is ambulatory with pain, no deformity noted.  Took tylenol and applied lidocaine patch around 1030 this morning 82 yo F, PMH of COPD, multiple lung nodules, remote history of TB as a child, now with multilobar pneumonia. 83 year old female with approximately 35 pack year smoking history PMH of pulmonary nodules, emphysema, previous hx of TB, HTN who presents with worsening dyspnea on exertion for two weeks, increased sputum production and cough with imaging concerning for PNA.

## 2024-04-15 NOTE — ED ADULT NURSE NOTE - NS ED NURSE DISCH DISPOSITION
admitted for stroke. on heparin for Ac due to pt npo. pt has a diagnosis of altered mental status PMH- CVA, Dementia,HLD, COPD, DM, HPT, LAW Admitted

## 2024-07-28 NOTE — ED PROVIDER NOTE - ATTESTATION, MLM
I have reviewed and confirmed nurses' notes for patient's medications, allergies, medical history, and surgical history.
Warm/Dry